# Patient Record
Sex: MALE | Race: WHITE | NOT HISPANIC OR LATINO | Employment: FULL TIME | URBAN - METROPOLITAN AREA
[De-identification: names, ages, dates, MRNs, and addresses within clinical notes are randomized per-mention and may not be internally consistent; named-entity substitution may affect disease eponyms.]

---

## 2022-01-03 ENCOUNTER — TELEPHONE (OUTPATIENT)
Dept: ORTHOPEDICS | Facility: CLINIC | Age: 32
End: 2022-01-03
Payer: COMMERCIAL

## 2022-01-03 DIAGNOSIS — M25.562 LEFT KNEE PAIN, UNSPECIFIED CHRONICITY: Primary | ICD-10-CM

## 2022-01-03 NOTE — TELEPHONE ENCOUNTER
Returned patient's call regarding message below. Informed patient that we can not order an MRI until he is seen and Dr. Espitia deems it necessary, but that his xrays will be about 30 mins before his appointment. Patient stated understanding.     ----- Message from Daja Mcpherson sent at 1/3/2022 10:40 AM CST -----  Contact: Tip  Patient is wanting orders placed for any tests like an X ray or MRI that need to be ran before his appointment on 1/5/2022. He is worried about a torn meniscus. Please call him back at [phone].

## 2022-01-05 ENCOUNTER — HOSPITAL ENCOUNTER (OUTPATIENT)
Dept: RADIOLOGY | Facility: HOSPITAL | Age: 32
Discharge: HOME | End: 2022-01-05
Attending: ORTHOPAEDIC SURGERY
Payer: COMMERCIAL

## 2022-01-05 ENCOUNTER — OFFICE VISIT (OUTPATIENT)
Dept: ORTHOPEDICS | Facility: CLINIC | Age: 32
End: 2022-01-05
Payer: COMMERCIAL

## 2022-01-05 VITALS — HEIGHT: 71 IN | BODY MASS INDEX: 32.2 KG/M2 | WEIGHT: 230 LBS

## 2022-01-05 DIAGNOSIS — M25.562 LEFT KNEE PAIN, UNSPECIFIED CHRONICITY: ICD-10-CM

## 2022-01-05 DIAGNOSIS — S89.92XA INJURY OF LEFT KNEE, INITIAL ENCOUNTER: Primary | ICD-10-CM

## 2022-01-05 DIAGNOSIS — M25.462 EFFUSION OF LEFT KNEE: ICD-10-CM

## 2022-01-05 PROCEDURE — 3008F BODY MASS INDEX DOCD: CPT | Mod: CPTII,S$GLB,, | Performed by: PHYSICIAN ASSISTANT

## 2022-01-05 PROCEDURE — 73564 XR KNEE ORTHO LEFT WITH FLEXION: ICD-10-PCS | Mod: 26,LT,, | Performed by: RADIOLOGY

## 2022-01-05 PROCEDURE — 99999 PR PBB SHADOW E&M-EST. PATIENT-LVL III: ICD-10-PCS | Mod: PBBFAC,,, | Performed by: PHYSICIAN ASSISTANT

## 2022-01-05 PROCEDURE — 99203 OFFICE O/P NEW LOW 30 MIN: CPT | Mod: S$GLB,,, | Performed by: PHYSICIAN ASSISTANT

## 2022-01-05 PROCEDURE — 3008F PR BODY MASS INDEX (BMI) DOCUMENTED: ICD-10-PCS | Mod: CPTII,S$GLB,, | Performed by: PHYSICIAN ASSISTANT

## 2022-01-05 PROCEDURE — 1160F PR REVIEW ALL MEDS BY PRESCRIBER/CLIN PHARMACIST DOCUMENTED: ICD-10-PCS | Mod: CPTII,S$GLB,, | Performed by: PHYSICIAN ASSISTANT

## 2022-01-05 PROCEDURE — 1159F PR MEDICATION LIST DOCUMENTED IN MEDICAL RECORD: ICD-10-PCS | Mod: CPTII,S$GLB,, | Performed by: PHYSICIAN ASSISTANT

## 2022-01-05 PROCEDURE — 73562 X-RAY EXAM OF KNEE 3: CPT | Mod: 26,RT,, | Performed by: RADIOLOGY

## 2022-01-05 PROCEDURE — 99203 PR OFFICE/OUTPT VISIT, NEW, LEVL III, 30-44 MIN: ICD-10-PCS | Mod: S$GLB,,, | Performed by: PHYSICIAN ASSISTANT

## 2022-01-05 PROCEDURE — 1160F RVW MEDS BY RX/DR IN RCRD: CPT | Mod: CPTII,S$GLB,, | Performed by: PHYSICIAN ASSISTANT

## 2022-01-05 PROCEDURE — 73564 X-RAY EXAM KNEE 4 OR MORE: CPT | Mod: 26,LT,, | Performed by: RADIOLOGY

## 2022-01-05 PROCEDURE — 73562 XR KNEE ORTHO LEFT WITH FLEXION: ICD-10-PCS | Mod: 26,RT,, | Performed by: RADIOLOGY

## 2022-01-05 PROCEDURE — 1159F MED LIST DOCD IN RCRD: CPT | Mod: CPTII,S$GLB,, | Performed by: PHYSICIAN ASSISTANT

## 2022-01-05 PROCEDURE — 73562 X-RAY EXAM OF KNEE 3: CPT | Mod: 59,TC,RT

## 2022-01-05 PROCEDURE — 99999 PR PBB SHADOW E&M-EST. PATIENT-LVL III: CPT | Mod: PBBFAC,,, | Performed by: PHYSICIAN ASSISTANT

## 2022-01-05 RX ORDER — DICLOFENAC SODIUM 10 MG/G
2 GEL TOPICAL 3 TIMES DAILY
Qty: 1 EACH | Refills: 1 | Status: SHIPPED | OUTPATIENT
Start: 2022-01-05

## 2022-01-05 RX ORDER — DEXTROAMPHETAMINE SACCHARATE, AMPHETAMINE ASPARTATE, DEXTROAMPHETAMINE SULFATE AND AMPHETAMINE SULFATE 5; 5; 5; 5 MG/1; MG/1; MG/1; MG/1
1 TABLET ORAL 2 TIMES DAILY
COMMUNITY
Start: 2021-12-31

## 2022-01-05 NOTE — LETTER
January 5, 2022      [facility]  [address]  [place] [address]  Phone: [phone]  Fax: [phone]       Patient: Tip Wiggins   YOB: 1990  Date of Visit: 01/05/2022    To Whom It May Concern:    Kari Wiggins  was at [facility] on 01/05/2022. The patient may return to work/school on 1/6/2021 with restrictions; seated work only and no ladders or stairs. If you have any questions or concerns, or if I can be of further assistance, please do not hesitate to contact me.    Sincerely,    Gretchen Slade PA-C

## 2022-01-05 NOTE — PATIENT INSTRUCTIONS
Assessment:  Tip Wiggins is a  31 y.o. 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 with a chief complaint of Pain of the Left Knee  Self referred for left knee pain after an injury that occurred in 12/31/21 when his knee gave out.    Left knee injury    Encounter Diagnoses   Name Primary?    Injury of left knee, initial encounter Yes    Effusion of left knee         Plan:   Note for work: Seated work at this time, no ladders or stairs.    MRI for left knee   [facility]: [facility]    Voltaren gel   Start: Ibuprofen OTC    Follow-up: After imaging or sooner if there are any problems between now and then.    Thank you for choosing [facility] and Dr. Parish Espitia for your orthopedic & sports medicine care. It is our goal to provide you with exceptional care that will help keep you healthy, active, and get you back in the game.    If you felt that you received exemplary care today, please consider leaving us feedback on Healthgrades at [email or web address].     Please do not hesitate to reach out to us via email, phone, or MyChart with any questions, concerns, or feedback.    If you are experiencing pain/discomfort ,or have questions after 5pm and would like to be connected to the [facility] on-call team, please call this number and specify which Sports Medicine provider is treating you: [phone]

## 2022-01-05 NOTE — PROGRESS NOTES
Patient ID: Tip Wiggins  YOB: 1990  MRN: [id number]    Chief Complaint: Pain of the Left Knee    Referred By: In network    History of Present Illness: Tip Wiggins is a 31 y.o. 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 with a chief complaint of Pain of the Left Knee  Symptom Onset:  His pain began on 12/31/2021.  There was a specific injury.  Patient states that his nephew jumped on his back and when he turned he felt and heard his left knee pop and fell.  Prior Treatment:   He has previously been treated by [facility] on .  Treatment included knee brace and icing.     Current Symptoms: His pain is currently located left knee.  Average level of pain is 4/10 when having pain.  Pain is improving.  He has experienced the following symptoms: pain, swelling, instability, giving way and popping/clicking.  Aggravating activities include kneeling, lateral movements, pivoting and squatting.     Past Medical History:   History reviewed. No pertinent past medical history.  Past Surgical History:   Procedure Laterality Date    ADENOIDECTOMY       History reviewed. No pertinent family history.  Social History     Socioeconomic History    Marital status: Unknown   Tobacco Use    Smoking status: Former Smoker    Smokeless tobacco: Current User     Review of patient's allergies indicates:  No Known Allergies    Physical Exam:   Body mass index is 32.08 kg/m².  GENERAL: Well appearing, appropriate for stated age, no acute distress.  CARDIOVASCULAR: Pulses regular by peripheral palpation.  PULMONARY: Respirations are even and non-labored.  NEURO: Awake, alert, and oriented x 3.  PSYCH: Mood & affect are appropriate.  HEENT: Head is normocephalic and atraumatic.    General    Nursing note and vitals reviewed.          Right Knee Exam   Right knee exam is normal.    Inspection   Effusion: absent    Tenderness   The patient is experiencing no tenderness.     Range of Motion   Extension: 0   Flexion: 120      Tests   Ligament Examination Lachman: normal (-1 to 2mm) PCL-Posterior Drawer: normal (0 to 2mm)     MCL - Valgus: normal (0 to 2mm)  LCL - Varus: normal    Other   Sensation: normal    Left Knee Exam   Left knee exam is normal.    Inspection   Effusion: present    Tenderness   The patient tender to palpation of the medial joint line.    Range of Motion   Extension: 5   Flexion: 120     Tests   Meniscus   Nir:  Medial - positive   Stability Lachman: normal (-1 to 2mm) PCL-Posterior Drawer: normal (0 to 2mm)  MCL - Valgus: normal (0 to 2mm)  LCL - Varus: normal (0 to 2mm)    Other   Popliteal (Baker's) Cyst: present  Sensation: normal    Comments:  Diffuse tenderness and moderate effusion. Muscle guarding    Muscle Strength   Right Lower Extremity   Hip Abduction: 5/5   Quadriceps:  5/5   Hamstrin/5   Left Lower Extremity   Hip Abduction: 5/5   Quadriceps:  4/5   Hamstrin/5     Vascular Exam     Right Pulses  Dorsalis Pedis:      2+  Posterior Tibial:      2+        Left Pulses  Dorsalis Pedis:      2+  Posterior Tibial:      2+            Neurovascular: Intact EHL, FHL, gastrocsoleus, and tibialis anterior. Sensation intact to light touch in superficial peroneal, deep peroneal, tibial, sural, and saphenous nerve distributions. Foot warm and well perfused with capillary refill of less than 2 seconds and palpable pedal pulses.     Imaging:   XR Results:  No results found for this or any previous visit.    MRI Results:  No results found for this or any previous visit.    CT Results:  No results found for this or any previous visit.    Relevant imaging results reviewed and interpreted by me, discussed with the patient and / or family today.     Other Tests:   No other tests performed today.    Patient Instructions     Assessment:  Tip Wiggins is a  31 y.o. 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 with a chief complaint of Pain of the Left Knee  Self referred for left knee pain after an injury  that occurred in 12/31/21 when his knee gave out.    Left knee injury    Encounter Diagnoses   Name Primary?    Injury of left knee, initial encounter Yes    Effusion of left knee         Plan:   Note for work: Seated work at this time, no ladders or stairs.    MRI for left knee   Physical therapy: [facility]    Voltaren gel   Start: Ibuprofen OTC    Follow-up: After imaging or sooner if there are any problems between now and then.    Thank you for choosing [facility] and Dr. Parish Espitia for your orthopedic & sports medicine care. It is our goal to provide you with exceptional care that will help keep you healthy, active, and get you back in the game.    If you felt that you received exemplary care today, please consider leaving us feedback on Healthgrades at [email or web address].     Please do not hesitate to reach out to us via email, phone, or MyChart with any questions, concerns, or feedback.    If you are experiencing pain/discomfort ,or have questions after 5pm and would like to be connected to the [facility] on-call team, please call this number and specify which Sports Medicine provider is treating you: [phone]         Provider Note/Medical Decision Making:   New injury with knee giving out and has noticed increase swelling and knee giving out with certain movements mostly pivoting motions. Possible meniscus tear but concerned about ACL injury with muscle guarding     I discussed worrisome and red flag signs and symptoms with the patient. The patient expressed understanding and agreed to alert me immediately or to go to the emergency room if they experience any of these.    Treatment plan was developed with input from the patient/family, and they expressed understanding and agreement with the plan. All questions were answered today.    Disclaimer: This note was prepared  using a voice recognition system and is likely to have sound alike errors within the text.

## 2022-01-06 ENCOUNTER — TELEPHONE (OUTPATIENT)
Dept: ORTHOPEDICS | Facility: CLINIC | Age: 32
End: 2022-01-06
Payer: COMMERCIAL

## 2022-01-07 ENCOUNTER — HOSPITAL ENCOUNTER (OUTPATIENT)
Dept: RADIOLOGY | Facility: HOSPITAL | Age: 32
Discharge: HOME | End: 2022-01-07
Attending: PHYSICIAN ASSISTANT
Payer: COMMERCIAL

## 2022-01-07 DIAGNOSIS — S89.92XA INJURY OF LEFT KNEE, INITIAL ENCOUNTER: ICD-10-CM

## 2022-01-07 PROCEDURE — 73721 MRI KNEE WITHOUT CONTRAST LEFT: ICD-10-PCS | Mod: 26,LT,, | Performed by: RADIOLOGY

## 2022-01-07 PROCEDURE — 73721 MRI JNT OF LWR EXTRE W/O DYE: CPT | Mod: 26,LT,, | Performed by: RADIOLOGY

## 2022-01-07 PROCEDURE — 73721 MRI JNT OF LWR EXTRE W/O DYE: CPT | Mod: TC,PO,LT

## 2022-01-12 ENCOUNTER — OFFICE VISIT (OUTPATIENT)
Dept: ORTHOPEDICS | Facility: CLINIC | Age: 32
End: 2022-01-12
Payer: COMMERCIAL

## 2022-01-12 VITALS — HEIGHT: 71 IN | WEIGHT: 230 LBS | BODY MASS INDEX: 32.2 KG/M2

## 2022-01-12 DIAGNOSIS — T14.8XXA CONTUSION OF BONE: ICD-10-CM

## 2022-01-12 DIAGNOSIS — S83.512A RUPTURE OF ANTERIOR CRUCIATE LIGAMENT OF LEFT KNEE, INITIAL ENCOUNTER: Primary | ICD-10-CM

## 2022-01-12 PROCEDURE — 3008F BODY MASS INDEX DOCD: CPT | Mod: CPTII,S$GLB,, | Performed by: ORTHOPAEDIC SURGERY

## 2022-01-12 PROCEDURE — 1159F MED LIST DOCD IN RCRD: CPT | Mod: CPTII,S$GLB,, | Performed by: ORTHOPAEDIC SURGERY

## 2022-01-12 PROCEDURE — 3008F PR BODY MASS INDEX (BMI) DOCUMENTED: ICD-10-PCS | Mod: CPTII,S$GLB,, | Performed by: ORTHOPAEDIC SURGERY

## 2022-01-12 PROCEDURE — 99999 PR PBB SHADOW E&M-EST. PATIENT-LVL III: ICD-10-PCS | Mod: PBBFAC,,, | Performed by: ORTHOPAEDIC SURGERY

## 2022-01-12 PROCEDURE — 99999 PR PBB SHADOW E&M-EST. PATIENT-LVL III: CPT | Mod: PBBFAC,,, | Performed by: ORTHOPAEDIC SURGERY

## 2022-01-12 PROCEDURE — 99214 PR OFFICE/OUTPT VISIT, EST, LEVL IV, 30-39 MIN: ICD-10-PCS | Mod: S$GLB,,, | Performed by: ORTHOPAEDIC SURGERY

## 2022-01-12 PROCEDURE — 1159F PR MEDICATION LIST DOCUMENTED IN MEDICAL RECORD: ICD-10-PCS | Mod: CPTII,S$GLB,, | Performed by: ORTHOPAEDIC SURGERY

## 2022-01-12 PROCEDURE — 99214 OFFICE O/P EST MOD 30 MIN: CPT | Mod: S$GLB,,, | Performed by: ORTHOPAEDIC SURGERY

## 2022-01-12 PROCEDURE — 1160F RVW MEDS BY RX/DR IN RCRD: CPT | Mod: CPTII,S$GLB,, | Performed by: ORTHOPAEDIC SURGERY

## 2022-01-12 PROCEDURE — 1160F PR REVIEW ALL MEDS BY PRESCRIBER/CLIN PHARMACIST DOCUMENTED: ICD-10-PCS | Mod: CPTII,S$GLB,, | Performed by: ORTHOPAEDIC SURGERY

## 2022-01-12 RX ORDER — CHOLECALCIFEROL (VITAMIN D3) 125 MCG
220 CAPSULE ORAL EVERY OTHER DAY
COMMUNITY

## 2022-01-12 NOTE — PROGRESS NOTES
Patient ID: Tip Wiggins  YOB: 1990  MRN: [id number]    Chief Complaint: Pain and Swelling of the Left Knee    Referred By: Self referred    History of Present Illness: Tip Wiggins is a 31 y.o. 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 with a chief complaint of Pain and Swelling of the Left Knee  Patient presents to the clinic today for a Left Knee MRI Review. He rates his pain as 4/10. He notes that yesterday his knee gave out from under him when he was stepping down from the tailgate of his truck. He felt like his knee was getting better until yesterday. Prior to the re-injury, he was able to squat slowly, kneeling was not bothering his knee, and pivoting would only sometimes aggravate his knee. Today is supposed to be his first day of PT, but he is considering postponing it due to the re-injury.    Previous History of Present Illness (1/5/2022):   Symptom Onset:           His pain began on 12/31/2021.  There was a specific injury.  Patient states that his nephew jumped on his back and when he turned he felt and heard his left knee pop and fell.  Prior Treatment:          He has previously been treated by [facility] on .  Treatment included knee brace and icing.     Current Symptoms:      His pain is currently located left knee.  Average level of pain is 4/10 when having pain.  Pain is improving.  He has experienced the following symptoms: pain, swelling, instability, giving way and popping/clicking.  Aggravating activities include kneeling, lateral movements, pivoting and squatting    Previous Plan:  ***    Past Medical History:   History reviewed. No pertinent past medical history.  Past Surgical History:   Procedure Laterality Date    ADENOIDECTOMY       History reviewed. No pertinent family history.  Social History     Socioeconomic History    Marital status: Unknown   Tobacco Use    Smoking status: Former Smoker    Smokeless tobacco: Current User     Medication List with  Changes/Refills   Current Medications    DEXTROAMPHETAMINE-AMPHETAMINE (ADDERALL) 20 MG TABLET    Take 1 tablet by mouth 2 (two) times daily.    DICLOFENAC SODIUM (VOLTAREN) 1 % GEL    Apply 2 g topically 3 (three) times daily.    NAPROXEN SODIUM (ALEVE) 220 MG CAP    Take 220 mg by mouth every other day.   Review of patient's allergies indicates:  No Known Allergies    Physical Exam:   Body mass index is 32.08 kg/m².  GENERAL: Well appearing, appropriate for stated age, no acute distress.  CARDIOVASCULAR: Pulses regular by peripheral palpation.  PULMONARY: Respirations are even and non-labored.  NEURO: Awake, alert, and oriented x 3.  PSYCH: Mood & affect are appropriate.  HEENT: Head is normocephalic and atraumatic.    General    Nursing note and vitals reviewed.          Right Knee Exam   Right knee exam is normal.    Inspection   Effusion: absent    Tenderness   The patient is experiencing no tenderness.     Range of Motion   Extension: 0   Flexion: 130     Tests   Ligament Examination Lachman: normal (-1 to 2mm) PCL-Posterior Drawer: normal (0 to 2mm)     MCL - Valgus: normal (0 to 2mm)  LCL - Varus: normal    Other   Sensation: normal    Left Knee Exam     Inspection   Effusion: present    Tenderness   The patient tender to palpation of the lateral joint line and lateral retinaculum.    Range of Motion   Extension: 0   Flexion: 110     Tests   Stability Lachman: abnormal PCL-Posterior Drawer: normal (0 to 2mm)  MCL - Valgus: normal (0 to 2mm)  LCL - Varus: normal (0 to 2mm)    Other   Sensation: normal    Muscle Strength   Right Lower Extremity   Hip Abduction: 5/5   Quadriceps:  5/5   Hamstrin/5   Left Lower Extremity   Hip Abduction: 5/5   Quadriceps:  4/5   Hamstrin/5     Vascular Exam     Right Pulses  Dorsalis Pedis:      2+  Posterior Tibial:      2+        Left Pulses  Dorsalis Pedis:      2+  Posterior Tibial:      2+            Neurovascular: Intact EHL, FHL, gastrocsoleus, and tibialis  anterior. Sensation intact to light touch in superficial peroneal, deep peroneal, tibial, sural, and saphenous nerve distributions. Foot warm and well perfused with capillary refill of less than 2 seconds and palpable pedal pulses.     Imaging:   XR Results:  Results for orders placed during the hospital encounter of 01/05/22    X-ray Knee Ortho Left with Flexion    Narrative  EXAMINATION:  XR KNEE ORTHO LEFT WITH FLEXION    CLINICAL HISTORY:  . Pain in left knee    TECHNIQUE:  AP standing view of both knees, PA flexion standing views of both knees, and Merchant views of both knees were performed. A lateral view of the left knee was also performed.    COMPARISON:  None    FINDINGS:  No acute fracture or dislocation.  Equivocal minimal medial compartment narrowing on either side.  Soft tissues are normal.    Impression  As above      Electronically signed by: Haja Tsang MD  Date:    01/05/2022  Time:    11:11    MRI Results:  Results for orders placed during the hospital encounter of 01/07/22    MRI Knee Without Contrast Left    Narrative  EXAMINATION:  MRI KNEE WITHOUT CONTRAST LEFT    CLINICAL HISTORY:  Knee trauma, no prior imaging (Age >= 5y);left knee injury;Unspecified injury of left lower leg, initial encounter    TECHNIQUE:  Multiplanar, multisequence images were preformed of the left knee.    COMPARISON:  None.    FINDINGS:  Menisci:  There is no discrete tear of the medial or lateral meniscus.    Ligaments:  ACL is disrupted at its femoral attachment.  PCL, MCL, and LCL complex are intact.    Tendons:  Extensor mechanism is maintained.    Cartilage:    Patellofemoral: Articular cartilage is maintained.    Medial tibiofemoral: Articular cartilage is maintained.    Lateral tibiofemoral: Articular cartilage is maintained.    Bone: Multifocal areas of marrow edema/contusion beneath the posterior aspect of the medial and lateral tibial plateaus and in the lateral femoral condyle.    Miscellaneous: Moderate  size knee joint effusion.  Baker's cyst.    Impression  1. ACL disruption.  2. Multifocal bony contusions.  3. Moderate knee joint effusion.      Electronically signed by: ALLISON Bianchi MD  Date:    01/07/2022  Time:    12:24    CT Results:  No results found for this or any previous visit.    Relevant imaging results reviewed and interpreted by me, discussed with the patient and / or family today.     Other Tests:   No other tests performed today.    Patient Instructions     Assessment:  Tip Wiggins is a  31 y.o. 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 with a chief complaint of Pain and Swelling of the Left Knee  Self referred for left knee pain after an injury that occurred on 12/31/21 when his left knee gave out.    Left knee injury    Encounter Diagnosis   Name Primary?    Rupture of anterior cruciate ligament of left knee, initial encounter Yes        Plan:   [facility] at [facility]- will start today   Voltaren gel & Ibuprofen OTC as needed      Follow-up:  or sooner if there are any problems between now and then.    Thank you for choosing [facility] and Dr. Parish Espitia for your orthopedic & sports medicine care. It is our goal to provide you with exceptional care that will help keep you healthy, active, and get you back in the game.    If you felt that you received exemplary care today, please consider leaving us feedback on Healthgrades at [email or web address].     Please do not hesitate to reach out to us via email, phone, or MyChart with any questions, concerns, or feedback.    If you are experiencing pain/discomfort ,or have questions after 5pm and would like to be connected to the [facility] on-call team, please call this number and specify which Sports Medicine provider is treating you: [phone]         Provider Note/Medical Decision Making: ***     I  discussed worrisome and red flag signs and symptoms with the patient. The patient expressed understanding and agreed to alert me immediately or to go to the emergency room if they experience any of these.    Treatment plan was developed with input from the patient/family, and they expressed understanding and agreement with the plan. All questions were answered today.    Disclaimer: This note was prepared using a voice recognition system and is likely to have sound alike errors within the text.

## 2022-01-12 NOTE — PROGRESS NOTES
Patient ID: Tip Wiggins  YOB: 1990  MRN: [id number]    Chief Complaint: Pain and Swelling of the Left Knee    Referred By: Self referred    History of Present Illness: Tip Wiggins is a 31 y.o. 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 with a chief complaint of Pain and Swelling of the Left Knee  Patient presents to the clinic today for a Left Knee MRI Review.  He injured his knee on 12/31/2021 when his nephew jumped on his back while pivoting and he felt a pop in his knee and fell down.  He rates his pain as 4/10. He notes that yesterday his knee gave out from under him when he was stepping down from the tailgate of his truck. He felt like his knee was getting better until yesterday. Prior to the re-injury, he was able to squat slowly, kneeling was not bothering his knee, and pivoting would only sometimes aggravate his knee. Today is supposed to be his first day of PT, but he is considering postponing it due to the re-injury.    Previous History of Present Illness (1/5/2022):   Symptom Onset:           His pain began on 12/31/2021.  There was a specific injury.  Patient states that his nephew jumped on his back and when he turned he felt and heard his left knee pop and fell.  Prior Treatment:          He has previously been treated by [facility] on .  Treatment included knee brace and icing.     Current Symptoms:      His pain is currently located left knee.  Average level of pain is 4/10 when having pain.  Pain is improving.  He has experienced the following symptoms: pain, swelling, instability, giving way and popping/clicking.  Aggravating activities include kneeling, lateral movements, pivoting and squatting    Previous Plan:  MRI ordered    Past Medical History:   History reviewed. No pertinent past medical history.  Past Surgical History:   Procedure Laterality Date    ADENOIDECTOMY       History reviewed. No pertinent family history.  Social History     Socioeconomic History    Marital  status: Unknown   Tobacco Use    Smoking status: Former Smoker    Smokeless tobacco: Current User     Medication List with Changes/Refills   Current Medications    DEXTROAMPHETAMINE-AMPHETAMINE (ADDERALL) 20 MG TABLET    Take 1 tablet by mouth 2 (two) times daily.    DICLOFENAC SODIUM (VOLTAREN) 1 % GEL    Apply 2 g topically 3 (three) times daily.    NAPROXEN SODIUM (ALEVE) 220 MG CAP    Take 220 mg by mouth every other day.   Review of patient's allergies indicates:  No Known Allergies    Physical Exam:   Body mass index is 32.08 kg/m².  GENERAL: Well appearing, appropriate for stated age, no acute distress.  CARDIOVASCULAR: Pulses regular by peripheral palpation.  PULMONARY: Respirations are even and non-labored.  NEURO: Awake, alert, and oriented x 3.  PSYCH: Mood & affect are appropriate.  HEENT: Head is normocephalic and atraumatic.    General    Nursing note and vitals reviewed.          Right Knee Exam   Right knee exam is normal.    Inspection   Effusion: absent    Tenderness   The patient is experiencing no tenderness.     Range of Motion   Extension: 0   Flexion: 130     Tests   Ligament Examination Lachman: normal (-1 to 2mm) PCL-Posterior Drawer: normal (0 to 2mm)     MCL - Valgus: normal (0 to 2mm)  LCL - Varus: normal    Other   Sensation: normal    Left Knee Exam     Inspection   Effusion: present    Tenderness   The patient tender to palpation of the lateral joint line and lateral retinaculum.    Range of Motion   Extension: 0   Flexion: 110     Tests   Stability Lachman: abnormal PCL-Posterior Drawer: normal (0 to 2mm)  MCL - Valgus: normal (0 to 2mm)  LCL - Varus: normal (0 to 2mm)    Other   Sensation: normal    Muscle Strength   Right Lower Extremity   Hip Abduction: 5/5   Quadriceps:  5/5   Hamstrin/5   Left Lower Extremity   Hip Abduction: 5/5   Quadriceps:  4/5   Hamstrin/5     Vascular Exam     Right Pulses  Dorsalis Pedis:      2+  Posterior Tibial:      2+        Left  Pulses  Dorsalis Pedis:      2+  Posterior Tibial:      2+            Neurovascular: Intact EHL, FHL, gastrocsoleus, and tibialis anterior. Sensation intact to light touch in superficial peroneal, deep peroneal, tibial, sural, and saphenous nerve distributions. Foot warm and well perfused with capillary refill of less than 2 seconds and palpable pedal pulses.     Imaging:   XR Results:  Results for orders placed during the hospital encounter of 01/05/22    X-ray Knee Ortho Left with Flexion    Narrative  EXAMINATION:  XR KNEE ORTHO LEFT WITH FLEXION    CLINICAL HISTORY:  . Pain in left knee    TECHNIQUE:  AP standing view of both knees, PA flexion standing views of both knees, and Merchant views of both knees were performed. A lateral view of the left knee was also performed.    COMPARISON:  None    FINDINGS:  No acute fracture or dislocation.  Equivocal minimal medial compartment narrowing on either side.  Soft tissues are normal.    Impression  As above      Electronically signed by: Haja Tsang MD  Date:    01/05/2022  Time:    11:11    MRI Results:  Results for orders placed during the hospital encounter of 01/07/22    MRI Knee Without Contrast Left    Narrative  EXAMINATION:  MRI KNEE WITHOUT CONTRAST LEFT    CLINICAL HISTORY:  Knee trauma, no prior imaging (Age >= 5y);left knee injury;Unspecified injury of left lower leg, initial encounter    TECHNIQUE:  Multiplanar, multisequence images were preformed of the left knee.    COMPARISON:  None.    FINDINGS:  Menisci:  There is no discrete tear of the medial or lateral meniscus.    Ligaments:  ACL is disrupted at its femoral attachment.  PCL, MCL, and LCL complex are intact.    Tendons:  Extensor mechanism is maintained.    Cartilage:    Patellofemoral: Articular cartilage is maintained.    Medial tibiofemoral: Articular cartilage is maintained.    Lateral tibiofemoral: Articular cartilage is maintained.    Bone: Multifocal areas of marrow edema/contusion  beneath the posterior aspect of the medial and lateral tibial plateaus and in the lateral femoral condyle.    Miscellaneous: Moderate size knee joint effusion.  Baker's cyst.    Impression  1. ACL disruption.  2. Multifocal bony contusions.  3. Moderate knee joint effusion.      Electronically signed by: ALLISON Bianchi MD  Date:    01/07/2022  Time:    12:24    CT Results:  No results found for this or any previous visit.    Relevant imaging results reviewed and interpreted by me, discussed with the patient and / or family today.     Other Tests:   No other tests performed today.    Patient Instructions     Assessment:  Tip Wiggins is a  31 y.o. male [employer] @ [employer]. with a chief complaint of Pain and Swelling of the Left Knee  Self referred for left knee pain after an injury that occurred on 12/31/21 when his left knee gave out.    Left knee injury   Left knee ACL femoral sided tear   Left knee bone contusion    Encounter Diagnoses   Name Primary?    Rupture of anterior cruciate ligament of left knee, initial encounter Yes    Contusion of bone         Plan:   Patient with femoral sided ACL tear.  He may meet criteria for ACL repair with BEAR implant.  We have discussed the procedure and outcomes from academic research studies that show this is an option.   After long discussion, the patient would like to trial non-operative treatment. We have explained that if he continues to have instability within the D-O protocol he may need surgery in the future.     We are checking with our representative about ACL repair to see what the time constraints are for repair.   Start physical therapy at [facility] with sports therapists with [place] protocol   Voltaren gel & Ibuprofen OTC as needed    Follow-up: 4 weeks or sooner if there are any problems between now and then.    Thank you for choosing [facility] and Dr. Parish Espitia for your orthopedic & sports  medicine care. It is our goal to provide you with exceptional care that will help keep you healthy, active, and get you back in the game.    If you felt that you received exemplary care today, please consider leaving us feedback on [employer] [employer] at [email or web address].     Please do not hesitate to reach out to us via email, phone, or MyChart with any questions, concerns, or feedback.    If you are experiencing pain/discomfort ,or have questions after 5pm and would like to be connected to the [facility] [place] on-call team, please call this number and specify which Sports Medicine provider is treating you: [phone]       Provider Note/Medical Decision Making:      I discussed worrisome and red flag signs and symptoms with the patient. The patient expressed understanding and agreed to alert me immediately or to go to the emergency room if they experience any of these.    Treatment plan was developed with input from the patient/family, and they expressed understanding and agreement with the plan. All questions were answered today.    Disclaimer: This note was prepared using a voice recognition system and is likely to have sound alike errors within the text.     I, Sae Mcneal, acted as a scribe for Parish Espitia MD for the duration of this office visit.

## 2022-01-12 NOTE — PATIENT INSTRUCTIONS
Assessment:  Tip Wiggins is a  31 y.o. male [employer] @ [employer]. with a chief complaint of Pain and Swelling of the Left Knee  Self referred for left knee pain after an injury that occurred on 12/31/21 when his left knee gave out.    Left knee injury   Left knee ACL femoral sided tear   Left knee bone contusion    Encounter Diagnoses   Name Primary?    Rupture of anterior cruciate ligament of left knee, initial encounter Yes    Contusion of bone         Plan:   Patient with femoral sided ACL tear.  He may meet criteria for ACL repair with BEAR implant.  We have discussed the procedure and outcomes from academic research studies that show this is an option.   After long discussion, the patient would like to trial non-operative treatment. We have explained that if he continues to have instability within the D-O protocol he may need surgery in the future.     We are checking with our representative about ACL repair to see what the time constraints are for repair.   Start physical therapy at [facility] with sports therapists with [place] protocol   Voltaren gel & Ibuprofen OTC as needed    Follow-up: 4 weeks or sooner if there are any problems between now and then.    Thank you for choosing [facility] and Dr. Parish Espitia for your orthopedic & sports medicine care. It is our goal to provide you with exceptional care that will help keep you healthy, active, and get you back in the game.    If you felt that you received exemplary care today, please consider leaving us feedback on [employer] at [email or web address].     Please do not hesitate to reach out to us via email, phone, or MyChart with any questions, concerns, or feedback.    If you are experiencing pain/discomfort ,or have questions after 5pm and would like to be connected to the [facility] on-call team, please call this  number and specify which Sports Medicine provider is treating you: [phone]

## 2022-01-13 ENCOUNTER — CLINICAL SUPPORT (OUTPATIENT)
Dept: REHABILITATION | Facility: HOSPITAL | Age: 32
End: 2022-01-13
Attending: ORTHOPAEDIC SURGERY
Payer: COMMERCIAL

## 2022-01-13 DIAGNOSIS — S83.512A RUPTURE OF ANTERIOR CRUCIATE LIGAMENT OF LEFT KNEE, INITIAL ENCOUNTER: ICD-10-CM

## 2022-01-13 DIAGNOSIS — M25.562 ACUTE PAIN OF LEFT KNEE: ICD-10-CM

## 2022-01-13 DIAGNOSIS — M62.81 PROXIMAL MUSCLE WEAKNESS: ICD-10-CM

## 2022-01-13 DIAGNOSIS — T14.8XXA CONTUSION OF BONE: ICD-10-CM

## 2022-01-13 PROCEDURE — 97110 THERAPEUTIC EXERCISES: CPT

## 2022-01-13 PROCEDURE — 97161 PT EVAL LOW COMPLEX 20 MIN: CPT

## 2022-01-13 NOTE — PLAN OF CARE
[facility]   Physical Therapy Initial Evaluation     Date: 1/13/2022   Name: Tip Wiggins  Clinic Number: [id number]    Therapy Diagnosis:   Encounter Diagnoses   Name Primary?    Rupture of anterior cruciate ligament of left knee, initial encounter     Contusion of bone     Acute pain of left knee     Proximal muscle weakness      Physician: Parish Espitia MD    Physician Orders: PT Eval and Treat  Medical Diagnosis from Referral:   S83.512A (ICD-10-CM) - Rupture of anterior cruciate ligament of left knee, initial encounter   T14.8XXA (ICD-10-CM) - Contusion of bone     Evaluation Date: 1/13/2022  Authorization Period Expiration: 12/01/2022  Plan of Care Expiration: 02/24/2022  Progress Note Due: 02/12/2022  Visit # / Visits authorized: 1/ 1   FOTO: 1/ 3     Precautions: Standard    Time In: 10:45 am  Time Out: 11:34 am   Total Appointment Time (timed & untimed codes): 49 minutes      SUBJECTIVE   Date of onset: January 31, 2021    History of current condition - Tip reports: standing on a hill with his left foot uphil and right foot downhill - friend of his joking jumped on his back and he felt his knee pop and give way after he made a quick turn to try and shrug the other individual off.    Falls: none    Imaging, MRI studies: rupture of left ACL    Prior Therapy: no  Social History: lives with their spouse  Occupation: [employer]   Prior Level of Function: independent   Current Level of Function: patient is able to ambulate with difficulty but is limited with activities that require moderate speed movements or movements that require stability such as work tasks, working in the yard.    Pain:  Current 2/10, worst 5/10, best 2/10   Location: left knee  left knee(s)   Description: moments of sharp pain  Aggravating Factors: Walking  Easing Factors: elevation, NSAIDs, bracing, anti-inflammatory ointment    Patients goals: return to work without  pain/discomfort     Medical History:   No past medical history on file.    Surgical History:   Tip Wiggins  has a past surgical history that includes Adenoidectomy.    Medications:   Tip has a current medication list which includes the following prescription(s): dextroamphetamine-amphetamine, diclofenac sodium, and naproxen sodium.    Allergies:   Review of patient's allergies indicates:  No Known Allergies       OBJECTIVE     Observation: Patient presents to physical therapy with an amicable comportment. Patient was willing to participate in all therapeutic activities.    Posture: unremarkable    Gait: flexed knee gait on left side during all phases      Range of Motion:     Knee Right active Right Passive Left Active Left passive Goal   Flexion 125 130 115 120 135 deg.   Extension 0 -2 Lacking 2 0 0 deg.       Lower Extremity Strength:    Right LE  Left LE  Goal   Knee extension: 4/5 Knee extension: 4-/5 5/5   Knee flexion: 4/5 Knee flexion: 4-/5 5/5   Hip flexion: 4/5 Hip flexion: 4/5 5/5   Hip extension:  4-/5 Hip extension: 4-/5 5/5   Hip abduction: 4-/5 Hip abduction: 4-/5 5/5     Ligamentous Pathology   Test Right Left   Anterior Drawer Negative Positive   Lachman's Negative Positive       Limitation/Restriction for FOTO Knee Survey    Therapist reviewed FOTO scores for Tip Wiggins on 1/13/2022.   FOTO documents entered into [employer] - see Media section.    Limitation Score: 47%         TREATMENT     Total Treatment time (time-based codes) separate from Evaluation: 15 minutes      Tip received the treatments listed below:      THERAPEUTIC EXERCISES to develop  strength, endurance, ROM, flexibility, posture and core stabilization for 15 minutes including patient education, assessment, and the following:    Intervention    Performed Today Sets/Reps/Resistance     Prone Terminal Knee Extension x 3x10   Long Arc Quad x 3x10   Quadriceps Set x 30x - holding 3 seconds   HEP x                Plan for Next Visit:         PATIENT EDUCATION AND HOME EXERCISES   Education provided:   Role of Physical Therapist  Physical Therapy Plan Of Care  home exercise program   [facility] Protocol  Importance of compliance with home exercise program   Compliance with attendance to therapy       Written Home Exercises Provided: yes.  Exercises were reviewed and Tip was able to demonstrate them prior to the end of the session.  Tip demonstrated good  understanding of the education provided.     See EMR under Patient Instructions for exercises provided 1/13/2022    ASSESSMENT     Tip is a 31 y.o. male referred to outpatient Physical Therapy with a medical diagnosis of rupture of the ACL, left knee; bone contusion. Patient presents with signs and symptoms consistent with a physical therapy diagnosis of left anterior cruciate ligament tear including the above listed objective test and measures. During today's session patient demonstrated understanding of plan of care and home exercise program.    Patient prognosis is Excellent.   Patientt will benefit from skilled outpatient Physical Therapy to address the deficits stated above and in the chart below, provide patient /family education, and to maximize patientt's level of independence.     Plan of care discussed with patient: Yes  Patient's spiritual, cultural and educational needs considered and patient is agreeable to the plan of care and goals as stated below:     Anticipated Barriers for therapy: lifetstyle    Medical Necessity is demonstrated by the following  History  Co-morbidities and personal factors that may impact the plan of care Co-morbidities:   none    Personal Factors:   lifestyle     low   Examination  Body Structures and Functions, activity limitations and participation restrictions that may impact the plan of care Body Regions:   lower extremities    Body Systems:    gross symmetry  ROM  strength  gross coordinated movement  balance  gait  transitions  motor  control    Participation Restrictions:   none    Activity limitations:   Learning and applying knowledge  no deficits    General Tasks and Commands  no deficits    Communication  no deficits    Mobility  no deficits    Self care  no deficits    Domestic Life  no deficits    Interactions/Relationships  no deficits    Life Areas  no deficits    Community and Social Life  community life  recreation and leisure         low   Clinical Presentation stable and uncomplicated low   Decision Making/ Complexity Score: low     Goals:    SHORT TERM GOALS:  4 weeks 1/13/2022   1. Recent signs and systems trend is improving in order to progress towards LTG's.    2. Patient will be independent with HEP in order to further progress and return to maximal function.    3. Pain rating at Worst: 5/10 in order to progress towards increased independence with activity.    4. Patient will be able to correct postural deviations in sitting and standing, to decrease pain and promote postural awareness for injury prevention.       LONG TERM GOALS: 6 weeks 1/13/2022   1. Patient will return to normal ADL, recreational, and work related activities with less pain and limitation.     2. Patient will improve AROM to stated goals in order to return to maximal functional potential.     3. Patient will improve Strength to stated goals of appropriate musculature in order to improve functional independence.     4. Pain Rating at Best: 1/10 to improve Quality of Life.     5. Patient will meet predicted functional outcome (FOTO) score: 77% to increase self-worth & perceived functional ability.    6. Patient will have met/partially met personal goal of: returning to work with minimal pain and/or limitations        PLAN   Plan of care Certification: 1/13/2022 to 02/24/2022.    Outpatient Physical Therapy 3 times weekly for 6 weeks to include the following interventions: Electrical Stimulation [place] and Symmetrical Biphasic, Gait Training, Manual Therapy,  Moist Heat/ Ice, Neuromuscular Re-ed, Patient Education, Self Care, Therapeutic Activities and Therapeutic Exercise.     Gerardo Montes, PT      I CERTIFY THE NEED FOR THESE SERVICES FURNISHED UNDER THIS PLAN OF TREATMENT AND WHILE UNDER MY CARE   Physician's comments:     Physician's Signature: ___________________________________________________

## 2022-01-19 ENCOUNTER — CLINICAL SUPPORT (OUTPATIENT)
Dept: REHABILITATION | Facility: HOSPITAL | Age: 32
End: 2022-01-19
Payer: COMMERCIAL

## 2022-01-19 DIAGNOSIS — M25.562 ACUTE PAIN OF LEFT KNEE: ICD-10-CM

## 2022-01-19 DIAGNOSIS — M62.81 PROXIMAL MUSCLE WEAKNESS: ICD-10-CM

## 2022-01-19 PROCEDURE — 97110 THERAPEUTIC EXERCISES: CPT | Performed by: PHYSICAL THERAPIST

## 2022-01-19 NOTE — PROGRESS NOTES
"[facility]   Physical Therapy Treatment Note     Name: Tip Wiggins  Clinic Number: [id number]    Therapy Diagnosis:   Encounter Diagnoses   Name Primary?    Acute pain of left knee     Proximal muscle weakness      Physician: Parish Espitia MD    Visit Date: 1/19/2022    Physician Orders: PT Eval and Treat  Medical Diagnosis from Referral:   S83.512A (ICD-10-CM) - Rupture of anterior cruciate ligament of left knee, initial encounter   T14.8XXA (ICD-10-CM) - Contusion of bone      Evaluation Date: 1/13/2022  Authorization Period Expiration: 12/01/2022  Plan of Care Expiration: 02/24/2022  Progress Note Due: 02/12/2022  Visit # / Visits authorized: 1/ 1   FOTO: 1/ 3       PTA Visit #: 0/5     Time In: 0830  Time Out: 0915  Total Billable Time: 45 minutes    Precautions: Standard    SUBJECTIVE     Pt reports: knee feels OK, some hamstring tightness but no further pain noted  He was compliant with home exercise program.  Response to previous treatment: no significant changes  Functional change: none yet    Pain: 1/10  Location: left knee      OBJECTIVE     Objective Measures updated at progress report unless specified.     TREATMENT     Tip received the treatments listed below:      Tip received therapeutic exercises to develop strength for 45 minutes including:  Quad sets 5" hold, 25x  SLR 3x8  LAQ 3#, 30x  Lateral squat walks 3 laps RTB  Monster walks 3 laps RTB  Shuttle leg press 8 bands 3x12 (DL)  Shuttle leg press 7 bands 25x (SL)  Step ups 8" box, 10#KB. 25x  Lateral Step downs 4" box, balance pole used. 4x6  Upright bike 6 minutes L3    Tip received the following manual therapy techniques: Myofacial release and Soft tissue Mobilization were applied to the: left knee for 0 minutes, including:      Tip participated in neuromuscular re-education activities to improve: Balance, Kinesthetic, Sense and Proprioception for 0 minutes. The following activities were " included:      Tip participated in dynamic functional therapeutic activities to improve functional performance for   minutes, including:        Patient Education and Home Exercises     Education provided:   Role of Physical Therapist  Physical Therapy Plan Of Care  home exercise program   [facility] Protocol  Importance of compliance with home exercise program   Compliance with attendance to therapy         Written Home Exercises Provided: yes.  Exercises were reviewed and Tip was able to demonstrate them prior to the end of the session.  Tip demonstrated good  understanding of the education provided.      See EMR under Patient Instructions for exercises provided 1/13/2022    ASSESSMENT     Pt performed increase in quadriceps and hip strengthening with no increase in pain or instability in knee. Pt presents with very minimal swelling and stiffness in hamstrings as main symptoms at this point. Would like to try elliptical at some point in next week as well.     Tip Is progressing well towards his goals.   Pt prognosis is Excellent.     Pt will continue to benefit from skilled outpatient physical therapy to address the deficits listed in the problem list box on initial evaluation, provide pt/family education and to maximize pt's level of independence in the home and community environment.     Pt's spiritual, cultural and educational needs considered and pt agreeable to plan of care and goals.     Anticipated barriers to physical therapy: lifestyle    Goals:   SHORT TERM GOALS:  4 weeks 1/13/2022   1. Recent signs and systems trend is improving in order to progress towards LTG's.     2. Patient will be independent with HEP in order to further progress and return to maximal function.     3. Pain rating at Worst: 5/10 in order to progress towards increased independence with activity.     4. Patient will be able to correct postural deviations in sitting and standing, to decrease pain and promote postural  awareness for injury prevention.         LONG TERM GOALS: 6 weeks 1/13/2022   1. Patient will return to normal ADL, recreational, and work related activities with less pain and limitation.      2. Patient will improve AROM to stated goals in order to return to maximal functional potential.      3. Patient will improve Strength to stated goals of appropriate musculature in order to improve functional independence.      4. Pain Rating at Best: 1/10 to improve Quality of Life.      5. Patient will meet predicted functional outcome (FOTO) score: 77% to increase self-worth & perceived functional ability.     6. Patient will have met/partially met personal goal of: returning to work with minimal pain and/or limitations           PLAN     Continue with physical therapy as planned.     Plan of care Certification: 1/13/2022 to 02/24/2022.     Outpatient Physical Therapy 3 times weekly for 6 weeks to include the following interventions: Electrical Stimulation [place]oth[place] and Symmetrical Biphasic, Gait Training, Manual Therapy, Moist Heat/ Ice, Neuromuscular Re-ed, Patient Education, Self Care, Therapeutic Activities and Therapeutic Exercise.     Kaleb Crawford, PT, DPT

## 2022-01-26 ENCOUNTER — CLINICAL SUPPORT (OUTPATIENT)
Dept: REHABILITATION | Facility: HOSPITAL | Age: 32
End: 2022-01-26
Payer: COMMERCIAL

## 2022-01-26 DIAGNOSIS — M25.562 ACUTE PAIN OF LEFT KNEE: ICD-10-CM

## 2022-01-26 DIAGNOSIS — M62.81 PROXIMAL MUSCLE WEAKNESS: ICD-10-CM

## 2022-01-26 PROCEDURE — 97110 THERAPEUTIC EXERCISES: CPT | Performed by: PHYSICAL THERAPIST

## 2022-01-26 NOTE — PROGRESS NOTES
"[facility]   Physical Therapy Treatment Note     Name: Tip Wiggins  Clinic Number: [id number]    Therapy Diagnosis:   Encounter Diagnoses   Name Primary?    Acute pain of left knee     Proximal muscle weakness      Physician: Parish Espitia MD    Visit Date: 1/26/2022    Physician Orders: PT Eval and Treat  Medical Diagnosis from Referral:   S83.512A (ICD-10-CM) - Rupture of anterior cruciate ligament of left knee, initial encounter   T14.8XXA (ICD-10-CM) - Contusion of bone      Evaluation Date: 1/13/2022  Authorization Period Expiration: 12/31/2022  Plan of Care Expiration: 02/24/2022  Progress Note Due: 02/12/2022  Visit # / Visits authorized: 2/ 20  FOTO: 1/ 3       PTA Visit #: 0/5     Time In: 12:45 pm  Time Out: 1:30 pm  Total Billable Time: 42 minutes    Precautions: Standard    SUBJECTIVE     Pt reports: no pain, just feelings of instability. Patient reports increased knee stiffness following long car ride over the weekend.   He was compliant with home exercise program.  Response to previous treatment: muscle fatigue and soreness   Functional change: none yet    Pain: 1/10  Location: left knee      OBJECTIVE     Objective Measures updated at progress report unless specified.     TREATMENT     Tip received the treatments listed below:      Tip received therapeutic exercises to develop strength for 42 minutes including:  Quad sets 5" hold, 25x  SLR 3x10  LAQ 3#, 30x  Standing Hamstring Curls, 2#, 3x10   Lateral squat walks 3 laps RTB  Monster walks 3 laps RTB  Shuttle leg press 8 bands 3x12 (DL)  Shuttle leg press 7 bands 25x (SL)  Step ups 8" box, 10#KB. 25x  Lateral Step downs 4" box, balance pole used. 4x6  Upright bike 6 minutes L3  Single Leg Stance - left, with ~20 degrees of knee flexion, 1x30" on turf, 2x30" on airex  Wall squats with ball - 3x8    Tip received the following manual therapy techniques: Myofacial release and Soft tissue Mobilization were applied " to the: left knee for 0 minutes, including:      Tip participated in neuromuscular re-education activities to improve: Balance, Kinesthetic, Sense and Proprioception for 0 minutes. The following activities were included:      Tip participated in dynamic functional therapeutic activities to improve functional performance for   minutes, including:        Patient Education and Home Exercises     Education provided:   Role of Physical Therapist  Physical Therapy Plan Of Care  home exercise program   [facility] Protocol  Importance of compliance with home exercise program   Compliance with attendance to therapy         Written Home Exercises Provided: yes.  Exercises were reviewed and Tip was able to demonstrate them prior to the end of the session.  Tip demonstrated good  understanding of the education provided.      See EMR under Patient Instructions for exercises provided 1/13/2022    ASSESSMENT   Patient tolerated treatment well today with no complaints of discomfort or increased pain. Patient requires cueing for proper quad activation during Therapeutic Exercise.     Tip Is progressing well towards his goals.   Pt prognosis is Excellent.     Pt will continue to benefit from skilled outpatient physical therapy to address the deficits listed in the problem list box on initial evaluation, provide pt/family education and to maximize pt's level of independence in the home and community environment.     Pt's spiritual, cultural and educational needs considered and pt agreeable to plan of care and goals.     Anticipated barriers to physical therapy: lifestyle    Goals:   SHORT TERM GOALS:  4 weeks 1/13/2022   1. Recent signs and systems trend is improving in order to progress towards LTG's.     2. Patient will be independent with HEP in order to further progress and return to maximal function.     3. Pain rating at Worst: 5/10 in order to progress towards increased independence with activity.     4. Patient  will be able to correct postural deviations in sitting and standing, to decrease pain and promote postural awareness for injury prevention.         LONG TERM GOALS: 6 weeks 1/13/2022   1. Patient will return to normal ADL, recreational, and work related activities with less pain and limitation.      2. Patient will improve AROM to stated goals in order to return to maximal functional potential.      3. Patient will improve Strength to stated goals of appropriate musculature in order to improve functional independence.      4. Pain Rating at Best: 1/10 to improve Quality of Life.      5. Patient will meet predicted functional outcome (FOTO) score: 77% to increase self-worth & perceived functional ability.     6. Patient will have met/partially met personal goal of: returning to work with minimal pain and/or limitations           PLAN     Continue with physical therapy as planned.     Plan of care Certification: 1/13/2022 to 02/24/2022.     Outpatient Physical Therapy 3 times weekly for 6 weeks to include the following interventions: Electrical Stimulation Kitt[place] and Symmetrical Biphasic, Gait Training, Manual Therapy, Moist Heat/ Ice, Neuromuscular Re-ed, Patient Education, Self Care, Therapeutic Activities and Therapeutic Exercise.     Kaleb Crawford, PT

## 2022-01-27 ENCOUNTER — CLINICAL SUPPORT (OUTPATIENT)
Dept: REHABILITATION | Facility: HOSPITAL | Age: 32
End: 2022-01-27
Payer: COMMERCIAL

## 2022-01-27 DIAGNOSIS — M25.562 ACUTE PAIN OF LEFT KNEE: ICD-10-CM

## 2022-01-27 DIAGNOSIS — M62.81 PROXIMAL MUSCLE WEAKNESS: ICD-10-CM

## 2022-01-27 PROCEDURE — 97110 THERAPEUTIC EXERCISES: CPT

## 2022-01-27 NOTE — PROGRESS NOTES
"[facility]   Physical Therapy Treatment Note     Name: Tip Wiggins  Clinic Number: [id number]    Therapy Diagnosis:   Encounter Diagnoses   Name Primary?    Acute pain of left knee     Proximal muscle weakness      Physician: Parish Espitia MD    Visit Date: 1/27/2022    Physician Orders: PT Eval and Treat  Medical Diagnosis from Referral:   S83.512A (ICD-10-CM) - Rupture of anterior cruciate ligament of left knee, initial encounter   T14.8XXA (ICD-10-CM) - Contusion of bone      Evaluation Date: 1/13/2022  Authorization Period Expiration: 12/31/2022  Plan of Care Expiration: 02/24/2022  Progress Note Due: 02/12/2022  Visit # / Visits authorized: 3/20  FOTO: 1/ 3       PTA Visit #: 0/5     Time In: 10:47 am  Time Out: 11:29 am  Total Billable Time: 38 minutes    Precautions: Standard    SUBJECTIVE     Pt reports: no significant change in status since his visit yesterday - muscle soreness.   He was compliant with home exercise program.  Response to previous treatment: muscle fatigue and soreness   Functional change: none yet    Pain: 1/10  Location: left knee      OBJECTIVE     Objective Measures updated at progress report unless specified.     TREATMENT     Tip received the treatments listed below:      Tip received therapeutic exercises to develop strength for 38 minutes including:    Upright bike 5 minutes L3  Quad sets 5" hold, 25x  SLR 3x10  LAQ 4#, 30x - Control Eccentric - 2 seconds  Prone Hamstring Curls, 4#, 3x8 - controlled eccentric  Lateral squat walks 3 laps Green Theraband   Monster walks 3 laps Green Theraband   Leg Press - Single Leg - 3x10 - 4 Plates   Single Leg Stance - left, with ~20 degrees of knee flexion, 1x30" on turf, 2x30" on MOBO  Single Leg Squats to Box - 2x6 - 24"    HELD:  Step ups 8" box, 10#KB. 25x  Lateral Step downs 4" box, balance pole used. 4x6    Tip received the following manual therapy techniques: Myofacial release and Soft tissue " Mobilization were applied to the: left knee for 0 minutes, including:      Tip participated in neuromuscular re-education activities to improve: Balance, Kinesthetic, Sense and Proprioception for 0 minutes. The following activities were included:      Tip participated in dynamic functional therapeutic activities to improve functional performance for   minutes, including:        Patient Education and Home Exercises     Education provided:   Role of Physical Therapist  Physical Therapy Plan Of Care  home exercise program   [facility] Protocol  Importance of compliance with home exercise program   Compliance with attendance to therapy         Written Home Exercises Provided: none.  Exercises were reviewed and Tip was able to demonstrate them prior to the end of the session.  Tip demonstrated good  understanding of the education provided.      See EMR under Patient Instructions for exercises provided 1/13/2022    ASSESSMENT   Patient tolerated treatment well today with no complaints of discomfort or increased pain. Patient requires cueing for proper quad activation during Therapeutic Exercise. Increased resistance used during band walks to improve lateral hip strength. Continue to progress plan of care as tolerated with an emphasis on improving lower extremity strength and range of motion.    Tip Is progressing well towards his goals.   Pt prognosis is Excellent.     Pt will continue to benefit from skilled outpatient physical therapy to address the deficits listed in the problem list box on initial evaluation, provide pt/family education and to maximize pt's level of independence in the home and community environment.     Pt's spiritual, cultural and educational needs considered and pt agreeable to plan of care and goals.     Anticipated barriers to physical therapy: lifestyle    Goals:   SHORT TERM GOALS:  4 weeks 1/13/2022   1. Recent signs and systems trend is improving in order to progress towards  LTG's. PM    2. Patient will be independent with HEP in order to further progress and return to maximal function.  PM   3. Pain rating at Worst: 5/10 in order to progress towards increased independence with activity.  PM   4. Patient will be able to correct postural deviations in sitting and standing, to decrease pain and promote postural awareness for injury prevention.   PM      LONG TERM GOALS: 6 weeks 1/13/2022   1. Patient will return to normal ADL, recreational, and work related activities with less pain and limitation.   PM   2. Patient will improve AROM to stated goals in order to return to maximal functional potential.   PM   3. Patient will improve Strength to stated goals of appropriate musculature in order to improve functional independence.   PM   4. Pain Rating at Best: 1/10 to improve Quality of Life.   PM   5. Patient will meet predicted functional outcome (FOTO) score: 77% to increase self-worth & perceived functional ability.  PM   6. Patient will have met/partially met personal goal of: returning to work with minimal pain and/or limitations  PM         PLAN     Continue with physical therapy as planned.     Plan of care Certification: 1/13/2022 to 02/24/2022.     Outpatient Physical Therapy 3 times weekly for 6 weeks to include the following interventions: Electrical Stimulation Burmese and Symmetrical Biphasic, Gait Training, Manual Therapy, Moist Heat/ Ice, Neuromuscular Re-ed, Patient Education, Self Care, Therapeutic Activities and Therapeutic Exercise.     Gerardo Montes, PT

## 2022-01-28 ENCOUNTER — CLINICAL SUPPORT (OUTPATIENT)
Dept: REHABILITATION | Facility: HOSPITAL | Age: 32
End: 2022-01-28
Payer: COMMERCIAL

## 2022-01-28 DIAGNOSIS — M25.562 ACUTE PAIN OF LEFT KNEE: ICD-10-CM

## 2022-01-28 DIAGNOSIS — M62.81 PROXIMAL MUSCLE WEAKNESS: ICD-10-CM

## 2022-01-28 PROCEDURE — 97140 MANUAL THERAPY 1/> REGIONS: CPT

## 2022-01-28 PROCEDURE — 97110 THERAPEUTIC EXERCISES: CPT

## 2022-01-28 NOTE — PROGRESS NOTES
"[facility]   Physical Therapy Treatment Note     Name: Tip Wiggins  Clinic Number: [id number]    Therapy Diagnosis:   Encounter Diagnoses   Name Primary?    Acute pain of left knee     Proximal muscle weakness      Physician: Parish Espitia MD    Visit Date: 1/28/2022    Physician Orders: PT Eval and Treat  Medical Diagnosis from Referral:   S83.512A (ICD-10-CM) - Rupture of anterior cruciate ligament of left knee, initial encounter   T14.8XXA (ICD-10-CM) - Contusion of bone      Evaluation Date: 1/13/2022  Authorization Period Expiration: 12/31/2022  Plan of Care Expiration: 02/24/2022  Progress Note Due: 02/12/2022  Visit # / Visits authorized: 4/20  FOTO: 1/ 3       PTA Visit #: 0/5     Time In: 9:38 am  Time Out: 10:14 am  Total Billable Time: 36 minutes    Precautions: Standard    SUBJECTIVE     Pt reports: no significant change in status since his visit yesterday - patient reports he went for 45 minutes on the elliptical this morning  He was compliant with home exercise program.  Response to previous treatment: muscle fatigue and soreness   Functional change: none yet    Pain: 0/10  Location: left knee      OBJECTIVE     Objective Measures updated at progress report unless specified.     TREATMENT     Tip received the treatments listed below:      Tip received therapeutic exercises to develop strength for 28 minutes including:    Upright bike 5 minutes L3  Quad sets 5" hold, 25x  SLR 3x10  LAQ 4#, 30x - Control Eccentric - 2 seconds  Prone Hamstring Curls, 4#, 3x8 - controlled eccentric  Lateral squat walks 3 laps Green Theraband   Monster walks 3 laps Green Theraband   Single Leg Stance - left, with ~20 degrees of knee flexion, 2x30" - MOBO - Kettlebell Pass - 15# - ODDS/EVENS  Single Leg Squats to Box - 3x6 - 22"  Step ups 12" box, 10#KB - right hand. 25x    HELD:  Lateral Step downs 4" box, balance pole used. 4x6  Leg Press - Single Leg - 3x10 - 4 Plates     Tip " received the following manual therapy techniques: Myofacial release and Soft tissue Mobilization were applied to the: left knee for 8 minutes, including:  Patellar Mobilizations  Tibiofemoral mobilization     Tip participated in neuromuscular re-education activities to improve: Balance, Kinesthetic, Sense and Proprioception for 0 minutes. The following activities were included:      Tip participated in dynamic functional therapeutic activities to improve functional performance for   minutes, including:        Patient Education and Home Exercises     Education provided:   Role of Physical Therapist  Physical Therapy Plan Of Care  home exercise program   [facility] Protocol  Importance of compliance with home exercise program   Compliance with attendance to therapy         Written Home Exercises Provided: continue prior home exercise program .  Exercises were reviewed and Tip was able to demonstrate them prior to the end of the session.  Tip demonstrated good  understanding of the education provided.      See EMR under Patient Instructions for exercises provided 1/13/2022    ASSESSMENT   Patient tolerated treatment well today with no complaints of discomfort or increased pain. Patient requires cueing for proper quad activation during single leg squats to box. Increased resistance used during step ups to improve lower extremity strength. Continue to progress plan of care as tolerated with an emphasis on improving lower extremity strength and range of motion.    Tip Is progressing well towards his goals.   Pt prognosis is Excellent.     Pt will continue to benefit from skilled outpatient physical therapy to address the deficits listed in the problem list box on initial evaluation, provide pt/family education and to maximize pt's level of independence in the home and community environment.     Pt's spiritual, cultural and educational needs considered and pt agreeable to plan of care and goals.      Anticipated barriers to physical therapy: lifestyle    Goals:   SHORT TERM GOALS:  4 weeks 1/13/2022   1. Recent signs and systems trend is improving in order to progress towards LTG's. PM    2. Patient will be independent with HEP in order to further progress and return to maximal function.  PM   3. Pain rating at Worst: 5/10 in order to progress towards increased independence with activity.  PM   4. Patient will be able to correct postural deviations in sitting and standing, to decrease pain and promote postural awareness for injury prevention.   PM      LONG TERM GOALS: 6 weeks 1/13/2022   1. Patient will return to normal ADL, recreational, and work related activities with less pain and limitation.   PM   2. Patient will improve AROM to stated goals in order to return to maximal functional potential.   PM   3. Patient will improve Strength to stated goals of appropriate musculature in order to improve functional independence.   PM   4. Pain Rating at Best: 1/10 to improve Quality of Life.   PM   5. Patient will meet predicted functional outcome (FOTO) score: 77% to increase self-worth & perceived functional ability.  PM   6. Patient will have met/partially met personal goal of: returning to work with minimal pain and/or limitations  PM         PLAN     Continue with physical therapy as planned.     Plan of care Certification: 1/13/2022 to 02/24/2022.     Outpatient Physical Therapy 3 times weekly for 6 weeks to include the following interventions: Electrical Stimulation Nauruan and Symmetrical Biphasic, Gait Training, Manual Therapy, Moist Heat/ Ice, Neuromuscular Re-ed, Patient Education, Self Care, Therapeutic Activities and Therapeutic Exercise.     Gerardo Montes, PT

## 2022-01-31 ENCOUNTER — CLINICAL SUPPORT (OUTPATIENT)
Dept: REHABILITATION | Facility: HOSPITAL | Age: 32
End: 2022-01-31
Payer: COMMERCIAL

## 2022-01-31 DIAGNOSIS — M62.81 PROXIMAL MUSCLE WEAKNESS: ICD-10-CM

## 2022-01-31 DIAGNOSIS — M25.562 ACUTE PAIN OF LEFT KNEE: ICD-10-CM

## 2022-01-31 PROCEDURE — 97110 THERAPEUTIC EXERCISES: CPT

## 2022-01-31 NOTE — PROGRESS NOTES
"[facility]   Physical Therapy Treatment Note     Name: Tip Wiggins  Clinic Number: [id number]    Therapy Diagnosis:   Encounter Diagnoses   Name Primary?    Acute pain of left knee     Proximal muscle weakness      Physician: Parish Espitia MD    Visit Date: 1/31/2022    Physician Orders: PT Eval and Treat  Medical Diagnosis from Referral:   S83.512A (ICD-10-CM) - Rupture of anterior cruciate ligament of left knee, initial encounter   T14.8XXA (ICD-10-CM) - Contusion of bone      Evaluation Date: 1/13/2022  Authorization Period Expiration: 12/31/2022  Plan of Care Expiration: 02/24/2022  Progress Note Due: 02/12/2022  Visit # / Visits authorized: 6/20  FOTO: 1/ 3       PTA Visit #: 0/5     Time In: 10:35 am  Time Out: 11:24 am  Total Billable Time: 51 minutes    Precautions: Standard    SUBJECTIVE     Pt reports: soreness in L anterior hip that he attributes to attending therapy three days in a row last week. Patient also reports decreased knee swelling and improved knee mobility during squatting. 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     He was compliant with home exercise program.  Response to previous treatment: muscle fatigue and soreness   Functional change: decreased swelling and improved knee mobility.    Pain: 0/10  Location: left knee      OBJECTIVE     Objective Measures updated at progress report unless specified.     TREATMENT     Tip received the treatments listed below:      Tip received therapeutic exercises to develop strength for 45 minutes including:    Upright bike 5 minutes L3  Quad sets 5" hold, 25x  SLR 3x10  Prone L hip flexor stretch with towel roll under knee - 3x30"  LAQ 5#, 30x - Control Eccentric - 2 seconds  Prone Hamstring Curls, 5#, 3x8 - controlled eccentric  Lateral squat walks 3 laps Green Theraband   Monster walks 3 " "ronna Green Theraband   Single Leg Stance - left, with ~20 degrees of knee flexion, 2x30" each - MOBO - Kettlebell Pass - 10# - ODDS/EVENS  Single Leg Squats to Box - 3x10 - 20"  Step ups 12" box, 10#KB - right hand. 25x    HELD:  Lateral Step downs 4" box, balance pole used. 4x6  Leg Press - Single Leg - 3x10 - 4 Plates     Tip received the following manual therapy techniques: Myofacial release and Soft tissue Mobilization were applied to the: left knee for 0 minutes, including:  Patellar Mobilizations  Tibiofemoral mobilization         Patient Education and Home Exercises     Education provided:   Role of Physical Therapist  Physical Therapy Plan Of Care  home exercise program   [facility] Protocol  Importance of compliance with home exercise program   Compliance with attendance to therapy         Written Home Exercises Provided: continue prior home exercise program .  Exercises were reviewed and Tip was able to demonstrate them prior to the end of the session.  Tip demonstrated good  understanding of the education provided.      See EMR under Patient Instructions for exercises provided 1/13/2022    ASSESSMENT   Patient tolerated treatment well today with no complaints of increased knee pain or discomfort. Progressed Therapeutic Exercise by adding Prone Hip Flexor Stretch to improve hip mobility and decrease soreness. Resistance increased for Long Arc Quad and Prone Hamstring Curl to improve left lower extremity strength.     Tip Is progressing well towards his goals.   Pt prognosis is Excellent.     Pt will continue to benefit from skilled outpatient physical therapy to address the deficits listed in the problem list box on initial evaluation, provide pt/family education and to maximize pt's level of independence in the home and community environment.     Pt's spiritual, cultural and educational needs considered and pt agreeable to plan of care and goals.     Anticipated barriers to physical therapy: " lifestyle    Goals:   SHORT TERM GOALS:  4 weeks 1/13/2022   1. Recent signs and systems trend is improving in order to progress towards LTG's. PM    2. Patient will be independent with HEP in order to further progress and return to maximal function.  PM   3. Pain rating at Worst: 5/10 in order to progress towards increased independence with activity.  PM   4. Patient will be able to correct postural deviations in sitting and standing, to decrease pain and promote postural awareness for injury prevention.   PM      LONG TERM GOALS: 6 weeks 1/13/2022   1. Patient will return to normal ADL, recreational, and work related activities with less pain and limitation.   PM   2. Patient will improve AROM to stated goals in order to return to maximal functional potential.   PM   3. Patient will improve Strength to stated goals of appropriate musculature in order to improve functional independence.   PM   4. Pain Rating at Best: 1/10 to improve Quality of Life.   PM   5. Patient will meet predicted functional outcome (FOTO) score: 77% to increase self-worth & perceived functional ability.  PM   6. Patient will have met/partially met personal goal of: returning to work with minimal pain and/or limitations  PM         PLAN     Continue with physical therapy as planned.     Plan of care Certification: 1/13/2022 to 02/24/2022.     Outpatient Physical Therapy 3 times weekly for 6 weeks to include the following interventions: Electrical Stimulation Ugandan and Symmetrical Biphasic, Gait Training, Manual Therapy, Moist Heat/ Ice, Neuromuscular Re-ed, Patient Education, Self Care, Therapeutic Activities and Therapeutic Exercise.       Maxine Cash, SPT  Aung Hirsch, PT

## 2022-02-02 ENCOUNTER — CLINICAL SUPPORT (OUTPATIENT)
Dept: REHABILITATION | Facility: HOSPITAL | Age: 32
End: 2022-02-02
Payer: COMMERCIAL

## 2022-02-02 ENCOUNTER — TELEPHONE (OUTPATIENT)
Dept: ORTHOPEDICS | Facility: CLINIC | Age: 32
End: 2022-02-02
Payer: COMMERCIAL

## 2022-02-02 DIAGNOSIS — M62.81 PROXIMAL MUSCLE WEAKNESS: ICD-10-CM

## 2022-02-02 DIAGNOSIS — M25.562 ACUTE PAIN OF LEFT KNEE: ICD-10-CM

## 2022-02-02 PROCEDURE — 97110 THERAPEUTIC EXERCISES: CPT

## 2022-02-02 NOTE — PROGRESS NOTES
[facility]   Physical Therapy Treatment Note     Name: Tip Wiggins  Clinic Number: [id number]    Therapy Diagnosis:   Encounter Diagnoses   Name Primary?    Acute pain of left knee     Proximal muscle weakness      Physician: Parish Espitia MD    Visit Date: 2/2/2022    Physician Orders: PT Eval and Treat  Medical Diagnosis from Referral:   S83.512A (ICD-10-CM) - Rupture of anterior cruciate ligament of left knee, initial encounter   T14.8XXA (ICD-10-CM) - Contusion of bone      Evaluation Date: 1/13/2022  Authorization Period Expiration: 12/31/2022  Plan of Care Expiration: 02/24/2022  Progress Note Due: 02/12/2022  Visit # / Visits authorized: 6/20   FOTO: 1/ 3       PTA Visit #: 0/5     Time In: 11:00 am  Time Out: 11:50 am  Total Billable Time: 45 minutes    Precautions: Standard    SUBJECTIVE     Pt reports: overall his knee if feeling good - he has been working out and staying compliant with home exercise program.    He was compliant with home exercise program.  Response to previous treatment: muscle fatigue and soreness   Functional change: decreased swelling and improved knee mobility.    Pain: 0/10  Location: left knee      OBJECTIVE     Objective Measures updated at progress report unless specified.     Range of Motion:      Knee Right active Right Passive Left Active Left passive Goal   Flexion 125 130 125 130 135 deg.   Extension 0 -2 hyper 0 -2 hyper 0 deg.         Lower Extremity Strength:     Right LE   Left LE   Goal   Knee extension: 5/5 Knee extension: 5/5 5/5   Knee flexion: 5/5 Knee flexion: 5/5 5/5   Hip flexion: 5/5 Hip flexion: 5/5 5/5   Hip extension:  4/5 Hip extension: 4/5 5/5   Hip abduction: 4/5 Hip abduction: 4/5 5/5          TREATMENT     Tip received the treatments listed below:      Tip received therapeutic exercises to develop strength for 45 minutes including patient education, assessment, and the following:    Elliptical - 2:30 forward - 2:30  "reverse (5 Total)  SLR 3x10  Prone L hip flexor stretch with towel roll under knee - 3x30"  Nautilus Knee Extensions - 3x8 + Single Leg Eccentrics - 2 Plates   Nautilus Leg Press - 4 Plates - 3x12  Trap Bar Deadlift 1x5 - 75#  Farmer's Carry + Varying steps and obstacles - 45# EACH hand - 3 Laps  Step Ups 12" Box - 3x10      HELD:    Prone Hamstring Curls, 5#, 3x8 - controlled eccentric  Lateral squat walks 3 laps Green Theraband   Monster walks 3 laps Green Theraband   Single Leg Stance - left, with ~20 degrees of knee flexion, 2x30" each - MOBO - Kettlebell Pass - 10# - ODDS/EVENS  Single Leg Squats to Box - 3x10 - 20"  Step ups 12" box, 10#KB - right hand. 25x  Lateral Step downs 4" box, balance pole used. 4x6  Leg Press - Single Leg - 3x10 - 4 Plates     Tip received the following manual therapy techniques: Myofacial release and Soft tissue Mobilization were applied to the: left knee for 0 minutes, including:  Patellar Mobilizations  Tibiofemoral mobilization         Patient Education and Home Exercises     Education provided:   Role of Physical Therapist  Physical Therapy Plan Of Care  home exercise program   [facility] Protocol  Importance of compliance with home exercise program   Compliance with attendance to therapy         Written Home Exercises Provided: continue prior home exercise program .  Exercises were reviewed and Tip was able to demonstrate them prior to the end of the session.  Tip demonstrated good  understanding of the education provided.      See EMR under Patient Instructions for exercises provided 1/13/2022    ASSESSMENT   Patient tolerated treatment well today with no complaints of increased knee pain or discomfort. 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 Tip has made great progress with physical therapy - will " continue to test his functional capacity over the next two visits before his follow up appointment with Dr. Espitia.  Tip Is progressing well towards his goals.   Pt prognosis is Excellent.     Pt will continue to benefit from skilled outpatient physical therapy to address the deficits listed in the problem list box on initial evaluation, provide pt/family education and to maximize pt's level of independence in the home and community environment.     Pt's spiritual, cultural and educational needs considered and pt agreeable to plan of care and goals.     Anticipated barriers to physical therapy: lifestyle    Goals:   SHORT TERM GOALS:  4 weeks 1/13/2022   1. Recent signs and systems trend is improving in order to progress towards LTG's. MET    2. Patient will be independent with HEP in order to further progress and return to maximal function.  MET   3. Pain rating at Worst: 5/10 in order to progress towards increased independence with activity.  MET   4. Patient will be able to correct postural deviations in sitting and standing, to decrease pain and promote postural awareness for injury prevention.   MET      LONG TERM GOALS: 6 weeks 1/13/2022   1. Patient will return to normal ADL, recreational, and work related activities with less pain and limitation.   MET   2. Patient will improve AROM to stated goals in order to return to maximal functional potential.   PM   3. Patient will improve Strength to stated goals of appropriate musculature in order to improve functional independence.   PM   4. Pain Rating at Best: 1/10 to improve Quality of Life.   MET   5. Patient will meet predicted functional outcome (FOTO) score: 77% to increase self-worth & perceived functional ability.  MET   6. Patient will have met/partially met personal goal of: returning to work with minimal pain and/or limitations  PM         PLAN     Continue with physical therapy as planned.     Plan of care Certification: 1/13/2022 to  02/24/2022.     Outpatient Physical Therapy 3 times weekly for 6 weeks to include the following interventions: Electrical Stimulation Chadian and Symmetrical Biphasic, Gait Training, Manual Therapy, Moist Heat/ Ice, Neuromuscular Re-ed, Patient Education, Self Care, Therapeutic Activities and Therapeutic Exercise.       Maxine Cash, SPT  Gerardo Montes, PT

## 2022-02-04 ENCOUNTER — PATIENT MESSAGE (OUTPATIENT)
Dept: ORTHOPEDICS | Facility: CLINIC | Age: 32
End: 2022-02-04
Payer: COMMERCIAL

## 2022-02-04 ENCOUNTER — CLINICAL SUPPORT (OUTPATIENT)
Dept: REHABILITATION | Facility: HOSPITAL | Age: 32
End: 2022-02-04
Payer: COMMERCIAL

## 2022-02-04 ENCOUNTER — TELEPHONE (OUTPATIENT)
Dept: ORTHOPEDICS | Facility: CLINIC | Age: 32
End: 2022-02-04
Payer: COMMERCIAL

## 2022-02-04 DIAGNOSIS — M25.562 ACUTE PAIN OF LEFT KNEE: ICD-10-CM

## 2022-02-04 DIAGNOSIS — M62.81 PROXIMAL MUSCLE WEAKNESS: ICD-10-CM

## 2022-02-04 PROCEDURE — 97110 THERAPEUTIC EXERCISES: CPT

## 2022-02-04 NOTE — TELEPHONE ENCOUNTER
LVM stating that I was attempting to reschedule 2/9 appt due to Dr. Espitia being out. Left call-back number and [employer] message sent

## 2022-02-04 NOTE — PROGRESS NOTES
"[facility]   Physical Therapy Treatment Note     Name: Tip Wiggins  Clinic Number: [id number]    Therapy Diagnosis:   Encounter Diagnoses   Name Primary?    Acute pain of left knee     Proximal muscle weakness      Physician: Parish Espitia MD    Visit Date: 2/4/2022    Physician Orders: PT Eval and Treat  Medical Diagnosis from Referral:   S83.512A (ICD-10-CM) - Rupture of anterior cruciate ligament of left knee, initial encounter   T14.8XXA (ICD-10-CM) - Contusion of bone      Evaluation Date: 1/13/2022  Authorization Period Expiration: 12/31/2022  Plan of Care Expiration: 02/24/2022  Progress Note Due: 03/08/2022  Visit # / Visits authorized: 7/20   FOTO: 2/ 3       PTA Visit #: 0/5     Time In: 11:05 am  Time Out: 11:45 am  Total Billable Time: 38 minutes    Precautions: Standard    SUBJECTIVE     Pt reports: no adverse reactions to last therapy session    He was compliant with home exercise program.  Response to previous treatment: muscle fatigue and soreness   Functional change: decreased swelling and improved knee mobility.    Pain: 0/10  Location: left knee      OBJECTIVE     Objective Measures updated at progress report unless specified.     TREATMENT     Tip received the treatments listed below:      Tip received therapeutic exercises to develop strength for 38 minutes including patient education, assessment, and the following:    Elliptical - 2:30 forward - 2:30 reverse (5 Total)  Single Leg Squat to Box + KB - 24" - 3x12 - 15#  SLR 3x10 - 3#   Nautilus Knee Extensions - 3x8 + Single Leg Eccentrics - 2 Plates   Nautilus Leg Press - 4 Plates - 3x12  Trap Bar Deadlift 3x5 - 75#  Farmer's Carry + Varying steps and obstacles and varying surfaces - 25# EACH hand - 7 Laps  Step Ups 12" Box - 3x10  Step Ups 18" Box - 1x5      HELD:    Prone Hamstring Curls, 5#, 3x8 - controlled eccentric  Lateral squat walks 3 laps Green Theraband   Monster walks 3 laps Green Theraband " "  Single Leg Stance - left, with ~20 degrees of knee flexion, 2x30" each - MOBO - Kettlebell Pass - 10# - ODDS/EVENS  Single Leg Squats to Box - 3x10 - 20"  Step ups 12" box, 10#KB - right hand. 25x  Lateral Step downs 4" box, balance pole used. 4x6  Leg Press - Single Leg - 3x10 - 4 Plates     Tip received the following manual therapy techniques: Myofacial release and Soft tissue Mobilization were applied to the: left knee for 0 minutes, including:  Patellar Mobilizations  Tibiofemoral mobilization         Patient Education and Home Exercises     Education provided:   Role of Physical Therapist  Physical Therapy Plan Of Care  home exercise program   [facility] Protocol  Importance of compliance with home exercise program   Compliance with attendance to therapy         Written Home Exercises Provided: continue prior home exercise program .  Exercises were reviewed and Tip was able to demonstrate them prior to the end of the session.  Tip demonstrated good  understanding of the education provided.      See EMR under Patient Instructions for exercises provided 1/13/2022    ASSESSMENT   Patient tolerated treatment well today with no complaints of increased knee pain or discomfort. 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 Tip has made great progress with physical therapy - will continue to test his functional capacity over the next visit.    Tip Is progressing well towards his goals.   Pt prognosis is Excellent.     Pt will continue to benefit from skilled outpatient physical therapy to address the deficits listed in the problem list box on initial evaluation, provide pt/family education and to maximize pt's level of independence in the home and community environment.     Pt's spiritual, cultural and educational needs considered and pt " agreeable to plan of care and goals.     Anticipated barriers to physical therapy: lifestyle    Goals:   SHORT TERM GOALS:  4 weeks 1/13/2022   1. Recent signs and systems trend is improving in order to progress towards LTG's. MET    2. Patient will be independent with HEP in order to further progress and return to maximal function.  MET   3. Pain rating at Worst: 5/10 in order to progress towards increased independence with activity.  MET   4. Patient will be able to correct postural deviations in sitting and standing, to decrease pain and promote postural awareness for injury prevention.   MET      LONG TERM GOALS: 6 weeks 1/13/2022   1. Patient will return to normal ADL, recreational, and work related activities with less pain and limitation.   MET   2. Patient will improve AROM to stated goals in order to return to maximal functional potential.   PM   3. Patient will improve Strength to stated goals of appropriate musculature in order to improve functional independence.   PM   4. Pain Rating at Best: 1/10 to improve Quality of Life.   MET   5. Patient will meet predicted functional outcome (FOTO) score: 77% to increase self-worth & perceived functional ability.  MET   6. Patient will have met/partially met personal goal of: returning to work with minimal pain and/or limitations  PM         PLAN     Continue with physical therapy as planned.     Plan of care Certification: 1/13/2022 to 02/24/2022.     Outpatient Physical Therapy 3 times weekly for 6 weeks to include the following interventions: Electrical Stimulation Nepalese and Symmetrical Biphasic, Gait Training, Manual Therapy, Moist Heat/ Ice, Neuromuscular Re-ed, Patient Education, Self Care, Therapeutic Activities and Therapeutic Exercise.     Gerardo Montes, PT

## 2022-02-07 ENCOUNTER — CLINICAL SUPPORT (OUTPATIENT)
Dept: REHABILITATION | Facility: HOSPITAL | Age: 32
End: 2022-02-07
Payer: COMMERCIAL

## 2022-02-07 DIAGNOSIS — M62.81 PROXIMAL MUSCLE WEAKNESS: ICD-10-CM

## 2022-02-07 DIAGNOSIS — M25.562 ACUTE PAIN OF LEFT KNEE: ICD-10-CM

## 2022-02-07 PROCEDURE — 97110 THERAPEUTIC EXERCISES: CPT

## 2022-02-07 NOTE — PROGRESS NOTES
"[facility]   Physical Therapy Treatment Note     Name: Tip Wiggins  Clinic Number: [id number]    Therapy Diagnosis:   Encounter Diagnoses   Name Primary?    Acute pain of left knee     Proximal muscle weakness      Physician: Parish Espitia MD    Visit Date: 2/7/2022    Physician Orders: PT Eval and Treat  Medical Diagnosis from Referral:   S83.512A (ICD-10-CM) - Rupture of anterior cruciate ligament of left knee, initial encounter   T14.8XXA (ICD-10-CM) - Contusion of bone      Evaluation Date: 1/13/2022  Authorization Period Expiration: 12/31/2022  Plan of Care Expiration: 02/24/2022  Progress Note Due: 03/08/2022  Visit # / Visits authorized: 9/20   FOTO: 2/ 3       PTA Visit #: 0/5     Time In: 11:25 am  Time Out: 12:08 pm  Total Billable Time: 43 minutes    Precautions: Standard    SUBJECTIVE     Pt reports: no complaints of knee pain.    He was compliant with home exercise program.  Response to previous treatment: mild muscle fatigue and soreness   Functional change: decreased swelling and improved knee mobility.    Pain: 0/10  Location: left knee      OBJECTIVE     Objective Measures updated at progress report unless specified.     TREATMENT     Tip received the treatments listed below:      Tip received therapeutic exercises to develop strength for 40 minutes including patient education, assessment, and the following:    Elliptical - 2:30 forward - 2:30 reverse (5 Total)  Step Ups 12" Box with 25# in each hand - 3x10  Lunges with slider - 2x10 each leg   Trap Bar Deadlift 3x5 - 95#  Single Leg Squat to Box + 15 #KB - 24" - 3x10  Farmer's Carry + Varying steps and obstacles and varying surfaces - 25# EACH hand - 8 Laps  Nautilus Knee Extensions - 3x12 + Single Leg Eccentrics - 2 Plates   Nautilus Leg Press - 5 Plates - 3x12  Single Leg Stance on BOSU - 3x30"    BOLD = new this visit      HELD:    SLR 3x10 - 3#   Prone Hamstring Curls, 5#, 3x8 - controlled " "eccentric  Lateral squat walks 3 laps Green Theraband   Monster walks 3 laps Green Theraband   Single Leg Stance - left, with ~20 degrees of knee flexion, 2x30" each - MOBO - Kettlebell Pass - 10# - ODDS/EVENS  Single Leg Squats to Box - 3x10 - 20"  Step ups 12" box, 10#KB - right hand. 25x  Lateral Step downs 4" box, balance pole used. 4x6  Leg Press - Single Leg - 3x10 - 4 Plates     Tip received the following manual therapy techniques: Myofacial release and Soft tissue Mobilization were applied to the: left knee for 0 minutes, including:  Patellar Mobilizations  Tibiofemoral mobilization         Patient Education and Home Exercises     Education provided:   Role of Physical Therapist  Physical Therapy Plan Of Care  home exercise program   [facility] Protocol  Importance of compliance with home exercise program   Compliance with attendance to therapy         Written Home Exercises Provided: continue prior home exercise program .  Exercises were reviewed and Tip was able to demonstrate them prior to the end of the session.  Tip demonstrated good  understanding of the education provided.      See EMR under Patient Instructions for exercises provided 1/13/2022    ASSESSMENT   Patient tolerated treatment well today with no complaints of increased pain. Progressed Therapeutic Exercise by adding Lunges and performing Step Ups with weights in bilateral upper extremities to simulate work duties and improve lower extremity strength.    Tip Is progressing well towards his goals.   Pt prognosis is Excellent.     Pt will continue to benefit from skilled outpatient physical therapy to address the deficits listed in the problem list box on initial evaluation, provide pt/family education and to maximize pt's level of independence in the home and community environment.     Pt's spiritual, cultural and educational needs considered and pt agreeable to plan of care and goals.     Anticipated barriers to physical therapy: " lifestyle    Goals:   SHORT TERM GOALS:  4 weeks 1/13/2022   1. Recent signs and systems trend is improving in order to progress towards LTG's. MET    2. Patient will be independent with HEP in order to further progress and return to maximal function.  MET   3. Pain rating at Worst: 5/10 in order to progress towards increased independence with activity.  MET   4. Patient will be able to correct postural deviations in sitting and standing, to decrease pain and promote postural awareness for injury prevention.   MET      LONG TERM GOALS: 6 weeks 1/13/2022   1. Patient will return to normal ADL, recreational, and work related activities with less pain and limitation.   MET   2. Patient will improve AROM to stated goals in order to return to maximal functional potential.   PM   3. Patient will improve Strength to stated goals of appropriate musculature in order to improve functional independence.   PM   4. Pain Rating at Best: 1/10 to improve Quality of Life.   MET   5. Patient will meet predicted functional outcome (FOTO) score: 77% to increase self-worth & perceived functional ability.  MET   6. Patient will have met/partially met personal goal of: returning to work with minimal pain and/or limitations  PM         PLAN     Continue with physical therapy as planned.     Plan of care Certification: 1/13/2022 to 02/24/2022.     Outpatient Physical Therapy 3 times weekly for 6 weeks to include the following interventions: Electrical Stimulation Norwegian and Symmetrical Biphasic, Gait Training, Manual Therapy, Moist Heat/ Ice, Neuromuscular Re-ed, Patient Education, Self Care, Therapeutic Activities and Therapeutic Exercise.     Maxine Cash, SPT  Aung Hirsch, PT

## 2022-02-09 ENCOUNTER — OFFICE VISIT (OUTPATIENT)
Dept: ORTHOPEDICS | Facility: CLINIC | Age: 32
End: 2022-02-09
Payer: COMMERCIAL

## 2022-02-09 VITALS — BODY MASS INDEX: 32.2 KG/M2 | HEIGHT: 71 IN | WEIGHT: 230 LBS

## 2022-02-09 DIAGNOSIS — M25.562 LEFT KNEE PAIN, UNSPECIFIED CHRONICITY: ICD-10-CM

## 2022-02-09 DIAGNOSIS — S89.92XD INJURY OF LEFT KNEE, SUBSEQUENT ENCOUNTER: ICD-10-CM

## 2022-02-09 DIAGNOSIS — S83.512D RUPTURE OF ANTERIOR CRUCIATE LIGAMENT OF LEFT KNEE, SUBSEQUENT ENCOUNTER: Primary | ICD-10-CM

## 2022-02-09 PROCEDURE — 1160F RVW MEDS BY RX/DR IN RCRD: CPT | Mod: CPTII,S$GLB,, | Performed by: PHYSICIAN ASSISTANT

## 2022-02-09 PROCEDURE — 99999 PR PBB SHADOW E&M-EST. PATIENT-LVL III: CPT | Mod: PBBFAC,,, | Performed by: PHYSICIAN ASSISTANT

## 2022-02-09 PROCEDURE — 3008F PR BODY MASS INDEX (BMI) DOCUMENTED: ICD-10-PCS | Mod: CPTII,S$GLB,, | Performed by: PHYSICIAN ASSISTANT

## 2022-02-09 PROCEDURE — 3008F BODY MASS INDEX DOCD: CPT | Mod: CPTII,S$GLB,, | Performed by: PHYSICIAN ASSISTANT

## 2022-02-09 PROCEDURE — 1159F PR MEDICATION LIST DOCUMENTED IN MEDICAL RECORD: ICD-10-PCS | Mod: CPTII,S$GLB,, | Performed by: PHYSICIAN ASSISTANT

## 2022-02-09 PROCEDURE — 99213 OFFICE O/P EST LOW 20 MIN: CPT | Mod: S$GLB,,, | Performed by: PHYSICIAN ASSISTANT

## 2022-02-09 PROCEDURE — 99999 PR PBB SHADOW E&M-EST. PATIENT-LVL III: ICD-10-PCS | Mod: PBBFAC,,, | Performed by: PHYSICIAN ASSISTANT

## 2022-02-09 PROCEDURE — 1159F MED LIST DOCD IN RCRD: CPT | Mod: CPTII,S$GLB,, | Performed by: PHYSICIAN ASSISTANT

## 2022-02-09 PROCEDURE — 99213 PR OFFICE/OUTPT VISIT, EST, LEVL III, 20-29 MIN: ICD-10-PCS | Mod: S$GLB,,, | Performed by: PHYSICIAN ASSISTANT

## 2022-02-09 PROCEDURE — 1160F PR REVIEW ALL MEDS BY PRESCRIBER/CLIN PHARMACIST DOCUMENTED: ICD-10-PCS | Mod: CPTII,S$GLB,, | Performed by: PHYSICIAN ASSISTANT

## 2022-02-09 NOTE — PATIENT INSTRUCTIONS
Assessment:  Tip Wiggins is a  31 y.o. 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 with a chief complaint of Pain of the Left Knee  Presents today for a recheck on left knee ACL tear occurring on 12/21/21, has been continuing to do non-operative treatment with physical therapy at [facility].   · Left knee ACL tear- nonoperative treatment plan     Encounter Diagnoses   Name Primary?    Rupture of anterior cruciate ligament of left knee, subsequent encounter Yes    Injury of left knee, subsequent encounter     Left knee pain, unspecified chronicity         Plan:   Fax over job requirements for return to work activities.    Recheck after completing physical therapy.     Follow-up: Recheck after completing physical therapy or sooner if there are any problems between now and then.    Thank you for choosing [facility] and Dr. Parish Espitia for your orthopedic & sports medicine care. It is our goal to provide you with exceptional care that will help keep you healthy, active, and get you back in the game.    If you felt that you received exemplary care today, please consider leaving us feedback on [employer] at [email or web address].     Please do not hesitate to reach out to us via email, phone, or MyChart with any questions, concerns, or feedback.    If you are experiencing pain/discomfort ,or have questions after 5pm and would like to be connected to the [facility] on-call team, please call this number and specify which Sports Medicine provider is treating you: [phone]

## 2022-02-09 NOTE — PROGRESS NOTES
Patient ID: Tip Wiggins  YOB: 1990  MRN: [id number]    Chief Complaint: Pain of the Left Knee    Referred By: Self referred    History of Present Illness: Tip Wiggins is a 31 y.o. 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 with a chief complaint of Pain of the Left Knee  Patient presents to the clinic today for a follow-up on his Left Knee 6 weeks s/p injury. He rates his average pain as 0/10, but notes some soreness today after doing the stair master for 20 minutes after 30 minutes of being on the elliptical yesterday. He goes to PT 3x per week at [facility]. He states that he started exercising (stairmaster, elliptical, stretching) about two weeks ago. He is hoping to be cleared to return to work today.    HPI    Previous History of Present Illness 1/12/2022:   Patient presents to the clinic today for a Left Knee MRI Review.  He injured his knee on 12/31/2021 when his nephew jumped on his back while pivoting and he felt a pop in his knee and fell down.  He rates his pain as 4/10. He notes that yesterday his knee gave out from under him when he was stepping down from the tailgate of his truck. He felt like his knee was getting better until yesterday. Prior to the re-injury, he was able to squat slowly, kneeling was not bothering his knee, and pivoting would only sometimes aggravate his knee. Today is supposed to be his first day of PT, but he is considering postponing it due to the re-injury.    Previous Plan:  · Patient with femoral sided ACL tear.  He may meet criteria for ACL repair with BEAR implant.  We have discussed the procedure and outcomes from academic research studies that show this is an option.  · After long discussion, the patient would like to trial non-operative treatment. We have explained that if he continues to have instability within the D-O protocol he may need surgery in the future.    · We are checking with our representative about ACL repair to see what  the time constraints are for repair.  · Start physical therapy at [facility] with sports therapists with Kay protocol  · Voltaren gel & Ibuprofen OTC as needed      Past Medical History:   History reviewed. No pertinent past medical history.  Past Surgical History:   Procedure Laterality Date    ADENOIDECTOMY       History reviewed. No pertinent family history.  Social History     Socioeconomic History    Marital status: Unknown   Tobacco Use    Smoking status: Former Smoker    Smokeless tobacco: Current User     Medication List with Changes/Refills   Current Medications    DEXTROAMPHETAMINE-AMPHETAMINE (ADDERALL) 20 MG TABLET    Take 1 tablet by mouth 2 (two) times daily.    DICLOFENAC SODIUM (VOLTAREN) 1 % GEL    Apply 2 g topically 3 (three) times daily.    NAPROXEN SODIUM 220 MG CAP    Take 220 mg by mouth every other day.   Review of patient's allergies indicates:  No Known Allergies    Physical Exam:   Body mass index is 32.08 kg/m².  GENERAL: Well appearing, appropriate for stated age, no acute distress.  CARDIOVASCULAR: Pulses regular by peripheral palpation.  PULMONARY: Respirations are even and non-labored.  NEURO: Awake, alert, and oriented x 3.  PSYCH: Mood & affect are appropriate.  HEENT: Head is normocephalic and atraumatic.    General    Nursing note and vitals reviewed.          Right Knee Exam   Right knee exam is normal.    Inspection   Effusion: absent    Tenderness   The patient is experiencing no tenderness.     Range of Motion   Extension: 0   Flexion: 120     Tests   Ligament Examination Lachman: normal (-1 to 2mm) PCL-Posterior Drawer: normal (0 to 2mm)     MCL - Valgus: normal (0 to 2mm)  LCL - Varus: normal    Other   Sensation: normal    Left Knee Exam     Inspection   Effusion: absent    Tenderness   The patient is experiencing no tenderness.     Range of Motion   Extension: 0   Flexion: 120     Tests   Stability Lachman: abnormal PCL-Posterior Drawer: normal (0 to  2mm)  MCL - Valgus: normal (0 to 2mm)  LCL - Varus: normal (0 to 2mm)    Other   Sensation: normal    Muscle Strength   Right Lower Extremity   Hip Abduction: 5/5   Quadriceps:  5/5   Hamstrin/5   Left Lower Extremity   Hip Abduction: 5/5   Quadriceps:  5/5   Hamstrin/5     Vascular Exam     Right Pulses  Dorsalis Pedis:      2+  Posterior Tibial:      2+        Left Pulses  Dorsalis Pedis:      2+  Posterior Tibial:      2+          Neurovascular: Intact EHL, FHL, gastrocsoleus, and tibialis anterior. Sensation intact to light touch in superficial peroneal, deep peroneal, tibial, sural, and saphenous nerve distributions. Foot warm and well perfused with capillary refill of less than 2 seconds and palpable pedal pulses.     Imaging:   XR Results:  Results for orders placed during the hospital encounter of 22    X-ray Knee Ortho Left with Flexion    Narrative  EXAMINATION:  XR KNEE ORTHO LEFT WITH FLEXION    CLINICAL HISTORY:  . Pain in left knee    TECHNIQUE:  AP standing view of both knees, PA flexion standing views of both knees, and Merchant views of both knees were performed. A lateral view of the left knee was also performed.    COMPARISON:  None    FINDINGS:  No acute fracture or dislocation.  Equivocal minimal medial compartment narrowing on either side.  Soft tissues are normal.    Impression  As above      Electronically signed by: Haja Tsang MD  Date:    2022  Time:    11:11    MRI Results:  Results for orders placed during the hospital encounter of 22    MRI Knee Without Contrast Left    Narrative  EXAMINATION:  MRI KNEE WITHOUT CONTRAST LEFT    CLINICAL HISTORY:  Knee trauma, no prior imaging (Age >= 5y);left knee injury;Unspecified injury of left lower leg, initial encounter    TECHNIQUE:  Multiplanar, multisequence images were preformed of the left knee.    COMPARISON:  None.    FINDINGS:  Menisci:  There is no discrete tear of the medial or lateral  meniscus.    Ligaments:  ACL is disrupted at its femoral attachment.  PCL, MCL, and LCL complex are intact.    Tendons:  Extensor mechanism is maintained.    Cartilage:    Patellofemoral: Articular cartilage is maintained.    Medial tibiofemoral: Articular cartilage is maintained.    Lateral tibiofemoral: Articular cartilage is maintained.    Bone: Multifocal areas of marrow edema/contusion beneath the posterior aspect of the medial and lateral tibial plateaus and in the lateral femoral condyle.    Miscellaneous: Moderate size knee joint effusion.  Baker's cyst.    Impression  1. ACL disruption.  2. Multifocal bony contusions.  3. Moderate knee joint effusion.      Electronically signed by: ALLISON Bianchi MD  Date:    01/07/2022  Time:    12:24    CT Results:  No results found for this or any previous visit.    Relevant imaging results reviewed and interpreted by me, discussed with the patient and / or family today.     Other Tests:   No other tests performed today.    Patient Instructions     Assessment:  Tip Wiggins is a  31 y.o. 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 with a chief complaint of Pain of the Left Knee  Presents today for a recheck on left knee ACL tear occurring on 12/21/21, has been continuing to do non-operative treatment with physical therapy at [facility].   · Left knee ACL tear- nonoperative treatment plan     Encounter Diagnoses   Name Primary?    Rupture of anterior cruciate ligament of left knee, subsequent encounter Yes    Injury of left knee, subsequent encounter     Left knee pain, unspecified chronicity         Plan:   Fax over job requirements for return to work activities.    Recheck after completing physical therapy.     Follow-up: Recheck after completing physical therapy or sooner if there are any problems between now and then.    Thank you for choosing [facility] and Dr. Parish Espitia for your orthopedic & sports medicine care. It is our  goal to provide you with exceptional care that will help keep you healthy, active, and get you back in the game.    If you felt that you received exemplary care today, please consider leaving us feedback on [employer] at [email or web address].     Please do not hesitate to reach out to us via email, phone, or MyChart with any questions, concerns, or feedback.    If you are experiencing pain/discomfort ,or have questions after 5pm and would like to be connected to the [facility] on-call team, please call this number and specify which Sports Medicine provider is treating you: [phone]         Provider Note/Medical Decision Making:   Patient is continuing to do physical therapy at [facility] with improvement have discussed possible return to work with physical therapy work assessment.    I discussed worrisome and red flag signs and symptoms with the patient. The patient expressed understanding and agreed to alert me immediately or to go to the emergency room if they experience any of these.    Treatment plan was developed with input from the patient/family, and they expressed understanding and agreement with the plan. All questions were answered today.    Disclaimer: This note was prepared using a voice recognition system and is likely to have sound alike errors within the text.

## 2022-02-10 ENCOUNTER — CLINICAL SUPPORT (OUTPATIENT)
Dept: REHABILITATION | Facility: HOSPITAL | Age: 32
End: 2022-02-10
Payer: COMMERCIAL

## 2022-02-10 DIAGNOSIS — M62.81 PROXIMAL MUSCLE WEAKNESS: ICD-10-CM

## 2022-02-10 DIAGNOSIS — M25.562 ACUTE PAIN OF LEFT KNEE: ICD-10-CM

## 2022-02-10 PROCEDURE — 97110 THERAPEUTIC EXERCISES: CPT

## 2022-02-10 NOTE — PROGRESS NOTES
"[facility]   Physical Therapy Treatment Note     Name: Tip Wiggins  Clinic Number: [id number]    Therapy Diagnosis:   Encounter Diagnoses   Name Primary?    Acute pain of left knee     Proximal muscle weakness      Physician: Parish Espitia MD    Visit Date: 2/10/2022    Physician Orders: PT Eval and Treat  Medical Diagnosis from Referral:   S83.512A (ICD-10-CM) - Rupture of anterior cruciate ligament of left knee, initial encounter   T14.8XXA (ICD-10-CM) - Contusion of bone      Evaluation Date: 1/13/2022  Authorization Period Expiration: 12/31/2022  Plan of Care Expiration: 02/24/2022  Progress Note Due: 03/08/2022  Visit # / Visits authorized: 9/20 (+eval)  FOTO: 2/ 3       PTA Visit #: 0/5     Time In: 11:40 am  Time Out: 12:15 pm  Total Billable Time: 30 minutes    Precautions: Standard    SUBJECTIVE     Pt reports: no complaints of knee pain. Performed stair master and elliptical the other day and reports muscle soreness - denies sharp pain    He was compliant with home exercise program.  Response to previous treatment: mild muscle fatigue and soreness   Functional change: decreased swelling and improved knee mobility.    Pain: 0/10  Location: left knee      OBJECTIVE     Objective Measures updated at progress report unless specified.     TREATMENT     Tip received the treatments listed below:      Tip received therapeutic exercises to develop strength for 30 minutes including patient education, assessment, and the following:    Elliptical - 2 forward - 2 reverse (4 Total)  Step Ups 12" Box with 25# in each hand - 3x8  Lunges with slider - 2x10 - bilateral   Rack Pulls - 95#  Single Leg Squat to Box + 15 #KB - 22" - 3x10  Nautilus Leg Press - 6 Plates - 3x12  Single Leg Stance on BOSU - 3x30"  Nautilus Knee Extensions - 3x12 + Single Leg- 2 Plates     BOLD = new this visit      HELD:  Farmer's Carry + Varying steps and obstacles and varying surfaces - 25# EACH hand - 8 " "Laps    SLR 3x10 - 3#   Prone Hamstring Curls, 5#, 3x8 - controlled eccentric  Lateral squat walks 3 laps Green Theraband   Monster walks 3 laps Green Theraband   Single Leg Stance - left, with ~20 degrees of knee flexion, 2x30" each - MOBO - Kettlebell Pass - 10# - ODDS/EVENS  Single Leg Squats to Box - 3x10 - 20"  Step ups 12" box, 10#KB - right hand. 25x  Lateral Step downs 4" box, balance pole used. 4x6  Leg Press - Single Leg - 3x10 - 4 Plates     Tip received the following manual therapy techniques: Myofacial release and Soft tissue Mobilization were applied to the: left knee for 0 minutes, including:  Patellar Mobilizations  Tibiofemoral mobilization         Patient Education and Home Exercises     Education provided:   Role of Physical Therapist  Physical Therapy Plan Of Care  home exercise program   [facility] Protocol  Importance of compliance with home exercise program   Compliance with attendance to therapy         Written Home Exercises Provided: continue prior home exercise program .  Exercises were reviewed and Tip was able to demonstrate them prior to the end of the session.  Tip demonstrated good  understanding of the education provided.      See EMR under Patient Instructions for exercises provided 1/13/2022    ASSESSMENT   Patient tolerated treatment well today with no complaints of increased pain. Increased resistance on leg press in order to improve lower extremity strength. Will begin progressing patient to return to work. Continue to progress plan of care as tolerated.    Tip Is progressing well towards his goals.   Pt prognosis is Excellent.     Pt will continue to benefit from skilled outpatient physical therapy to address the deficits listed in the problem list box on initial evaluation, provide pt/family education and to maximize pt's level of independence in the home and community environment.     Pt's spiritual, cultural and educational needs considered and pt agreeable to " plan of care and goals.     Anticipated barriers to physical therapy: lifestyle    Goals:   SHORT TERM GOALS:  4 weeks 1/13/2022   1. Recent signs and systems trend is improving in order to progress towards LTG's. MET    2. Patient will be independent with HEP in order to further progress and return to maximal function.  MET   3. Pain rating at Worst: 5/10 in order to progress towards increased independence with activity.  MET   4. Patient will be able to correct postural deviations in sitting and standing, to decrease pain and promote postural awareness for injury prevention.   MET      LONG TERM GOALS: 6 weeks 1/13/2022   1. Patient will return to normal ADL, recreational, and work related activities with less pain and limitation.   MET   2. Patient will improve AROM to stated goals in order to return to maximal functional potential.   PM   3. Patient will improve Strength to stated goals of appropriate musculature in order to improve functional independence.   PM   4. Pain Rating at Best: 1/10 to improve Quality of Life.   MET   5. Patient will meet predicted functional outcome (FOTO) score: 77% to increase self-worth & perceived functional ability.  MET   6. Patient will have met/partially met personal goal of: returning to work with minimal pain and/or limitations  PM         PLAN     Continue with physical therapy as planned.     Plan of care Certification: 1/13/2022 to 02/24/2022.     Outpatient Physical Therapy 3 times weekly for 6 weeks to include the following interventions: Electrical Stimulation Tristanian and Symmetrical Biphasic, Gait Training, Manual Therapy, Moist Heat/ Ice, Neuromuscular Re-ed, Patient Education, Self Care, Therapeutic Activities and Therapeutic Exercise.     Gerardo Montes, PT

## 2022-02-11 ENCOUNTER — CLINICAL SUPPORT (OUTPATIENT)
Dept: REHABILITATION | Facility: HOSPITAL | Age: 32
End: 2022-02-11
Payer: COMMERCIAL

## 2022-02-11 DIAGNOSIS — M25.562 ACUTE PAIN OF LEFT KNEE: ICD-10-CM

## 2022-02-11 DIAGNOSIS — M62.81 PROXIMAL MUSCLE WEAKNESS: ICD-10-CM

## 2022-02-11 PROCEDURE — 97110 THERAPEUTIC EXERCISES: CPT

## 2022-02-11 NOTE — PROGRESS NOTES
"[facility]   Physical Therapy Treatment Note     Name: Tip Wiggins  Clinic Number: [id number]    Therapy Diagnosis:   Encounter Diagnoses   Name Primary?    Acute pain of left knee     Proximal muscle weakness      Physician: Parish Espitia MD    Visit Date: 2/11/2022    Physician Orders: PT Eval and Treat  Medical Diagnosis from Referral:   S83.512A (ICD-10-CM) - Rupture of anterior cruciate ligament of left knee, initial encounter   T14.8XXA (ICD-10-CM) - Contusion of bone      Evaluation Date: 1/13/2022  Authorization Period Expiration: 12/31/2022  Plan of Care Expiration: 02/24/2022  Progress Note Due: 03/08/2022  Visit # / Visits authorized: 10/20 (+eval)  FOTO: 3/3       PTA Visit #: 0/5     Time In: 11:10 am  Time Out: 11:40 am  Total Billable Time: 28 minutes    Precautions: Standard    SUBJECTIVE     Pt reports: no complaints of knee pain. No adverse reactions to last therapy session.    He was compliant with home exercise program.  Response to previous treatment: mild muscle fatigue and soreness   Functional change: decreased swelling and improved knee mobility.    Pain: 0/10  Location: left knee      OBJECTIVE     Objective Measures updated at progress report unless specified.     TREATMENT     Tip received the treatments listed below:      Tip received therapeutic exercises to develop strength for 28 minutes including patient education, assessment, and the following:    Performed Work Requirements of:  -Lifting 50# from floor to waist  -Pushing and Pulling of 50# horizontally  -Carrying 20# while walking  -Climbing Stairs    Elliptical - 2:30 forward - 2:30 reverse (5 Total)  Lunges with slider - 2x10 - bilateral  Nautilus Leg Press - 6 Plates - 3x12  SLR 3x10 - 4#  Lateral squat walks 3 laps Black Theraband   Monster walks 3 laps Black Theraband       BOLD = new this visit      HELD:  Single Leg Squat to Box + 15 #KB - 22" - 3x10  Farmer's Carry + Varying steps and " "obstacles and varying surfaces - 25# EACH hand - 8 Laps  Step Ups 12" Box with 25# in each hand - 3x8  Nautilus Knee Extensions - 3x12 + Single Leg- 2 Plates   Prone Hamstring Curls, 5#, 3x8 - controlled eccentric    Single Leg Stance - left, with ~20 degrees of knee flexion, 2x30" each - MOBO - Kettlebell Pass - 10# - ODDS/EVENS  Single Leg Squats to Box - 3x10 - 20"  Step ups 12" box, 10#KB - right hand. 25x  Lateral Step downs 4" box, balance pole used. 4x6  Leg Press - Single Leg - 3x10 - 4 Plates     Tip received the following manual therapy techniques: Myofacial release and Soft tissue Mobilization were applied to the: left knee for 0 minutes, including:  Patellar Mobilizations  Tibiofemoral mobilization         Patient Education and Home Exercises     Education provided:   Role of Physical Therapist  Physical Therapy Plan Of Care  home exercise program   [facility] Protocol  Importance of compliance with home exercise program   Compliance with attendance to therapy         Written Home Exercises Provided: continue prior home exercise program .  Exercises were reviewed and Tip was able to demonstrate them prior to the end of the session.  Tip demonstrated good  understanding of the education provided.      See EMR under Patient Instructions for exercises provided 1/13/2022    ASSESSMENT   Patient tolerated treatment well today with no complaints of increased pain. Patient was put through activities that were directly related to his work requirements and he performed each activity with 100% proficiency and no reports of discomfort. Will send results of today's visit over to Dr. Espitia for further evaluation. Continue to progress plan of care as tolerated.    Tip Is progressing well towards his goals.   Pt prognosis is Excellent.     Pt will continue to benefit from skilled outpatient physical therapy to address the deficits listed in the problem list box on initial evaluation, provide pt/family " education and to maximize pt's level of independence in the home and community environment.     Pt's spiritual, cultural and educational needs considered and pt agreeable to plan of care and goals.     Anticipated barriers to physical therapy: lifestyle    Goals:   SHORT TERM GOALS:  4 weeks 1/13/2022   1. Recent signs and systems trend is improving in order to progress towards LTG's. MET    2. Patient will be independent with HEP in order to further progress and return to maximal function.  MET   3. Pain rating at Worst: 5/10 in order to progress towards increased independence with activity.  MET   4. Patient will be able to correct postural deviations in sitting and standing, to decrease pain and promote postural awareness for injury prevention.   MET      LONG TERM GOALS: 6 weeks 1/13/2022   1. Patient will return to normal ADL, recreational, and work related activities with less pain and limitation.   MET   2. Patient will improve AROM to stated goals in order to return to maximal functional potential.   PM   3. Patient will improve Strength to stated goals of appropriate musculature in order to improve functional independence.   PM   4. Pain Rating at Best: 1/10 to improve Quality of Life.   MET   5. Patient will meet predicted functional outcome (FOTO) score: 77% to increase self-worth & perceived functional ability.  MET   6. Patient will have met/partially met personal goal of: returning to work with minimal pain and/or limitations  PM         PLAN     Continue with physical therapy as planned.     Plan of care Certification: 1/13/2022 to 02/24/2022.     Outpatient Physical Therapy 3 times weekly for 6 weeks to include the following interventions: Electrical Stimulation Iraqi and Symmetrical Biphasic, Gait Training, Manual Therapy, Moist Heat/ Ice, Neuromuscular Re-ed, Patient Education, Self Care, Therapeutic Activities and Therapeutic Exercise.     Gerardo Montes, PT

## 2022-02-14 ENCOUNTER — TELEPHONE (OUTPATIENT)
Dept: ORTHOPEDICS | Facility: CLINIC | Age: 32
End: 2022-02-14
Payer: COMMERCIAL

## 2022-02-14 ENCOUNTER — CLINICAL SUPPORT (OUTPATIENT)
Dept: REHABILITATION | Facility: HOSPITAL | Age: 32
End: 2022-02-14
Payer: COMMERCIAL

## 2022-02-14 DIAGNOSIS — M62.81 PROXIMAL MUSCLE WEAKNESS: ICD-10-CM

## 2022-02-14 DIAGNOSIS — M25.562 ACUTE PAIN OF LEFT KNEE: ICD-10-CM

## 2022-02-14 PROCEDURE — 97110 THERAPEUTIC EXERCISES: CPT

## 2022-02-14 NOTE — TELEPHONE ENCOUNTER
LVM stating that I was returning pt call. 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

## 2022-02-14 NOTE — PROGRESS NOTES
"[facility]   Physical Therapy Treatment Note     Name: Tip Wiggins  Clinic Number: [id number]    Therapy Diagnosis:   Encounter Diagnoses   Name Primary?    Acute pain of left knee     Proximal muscle weakness      Physician: Parish Espitia MD    Visit Date: 2/14/2022    Physician Orders: PT Eval and Treat  Medical Diagnosis from Referral:   S83.512A (ICD-10-CM) - Rupture of anterior cruciate ligament of left knee, initial encounter   T14.8XXA (ICD-10-CM) - Contusion of bone      Evaluation Date: 1/13/2022  Authorization Period Expiration: 12/31/2022  Plan of Care Expiration: 02/24/2022  Progress Note Due: 03/08/2022  Visit # / Visits authorized: 11/20 (+1 for eval)  FOTO: 3/3       PTA Visit #: 0/5     Time In: 11:30 am  Time Out: 12:10 pm  Total Billable Time: 40 minutes    Precautions: Standard    SUBJECTIVE     Pt reports: mild soreness. Patient reports that his work has not received the return to work paperwork from MD office.      He was compliant with home exercise program.  Response to previous treatment: mild muscle fatigue and soreness   Functional change: decreased swelling and improved knee mobility.    Pain: 0/10  Location: left knee      OBJECTIVE     Objective Measures updated at progress report unless specified.     TREATMENT     Tip received the treatments listed below:      Tip received therapeutic exercises to develop strength for 38 minutes including patient education, assessment, and the following:    Elliptical - 2:30 forward - 2:30 reverse (5 Total)  Lunges with slider - 2x10 - bilateral  Nautilus Leg Press (DL) - 6 Plates - 3x12  Nautilus Leg Press (SL) - 6 plates - 3x10  Nautilus Knee Extensions (SL) - 3 plates - 3x10  Nautilus Hamstring Curls (DL) - 3 plates - 3x10  Lateral Step downs 6" box - 3x10  Lateral squat walks 2 laps Black Theraband   Monster walks 2 laps Black Theraband   Single Leg Squats to Box - 3x8 to 24" box without kickstand   Step Ups " "12" Box with 25# in each hand - 3x8  Single Leg Stance - left, with ~20 degrees of knee flexion, 2x30" each - MOBO - Kettlebell Pass - 10# - ODDS/EVENS      BOLD = new this visit      HELD:  Single Leg Squat to Box + 15 #KB - 22" - 3x10  Farmer's Carry + Varying steps and obstacles and varying surfaces - 25# EACH hand - 8 Laps  Prone Hamstring Curls, 5#, 3x8 - controlled eccentric  Step ups 12" box, 10#KB - right hand. 25x  Leg Press - Single Leg - 3x10 - 4 Plates   SLR 3x10 - 4#    Performed Work Requirements of:  -Lifting 50# from floor to waist  -Pushing and Pulling of 50# horizontally  -Carrying 20# while walking  -Climbing Stairs    Tip received the following manual therapy techniques: Myofacial release and Soft tissue Mobilization were applied to the: left knee for 0 minutes, including:  Patellar Mobilizations  Tibiofemoral mobilization         Patient Education and Home Exercises     Education provided:   Role of Physical Therapist  Physical Therapy Plan Of Care  home exercise program   [facility] Protocol  Importance of compliance with home exercise program   Compliance with attendance to therapy         Written Home Exercises Provided: continue prior home exercise program .  Exercises were reviewed and Tip was able to demonstrate them prior to the end of the session.  Tip demonstrated good  understanding of the education provided.      See EMR under Patient Instructions for exercises provided 1/13/2022    ASSESSMENT   Patient tolerated treatment well today with no complaints of pain. Progressed Therapeutic Exercise by adding Nautilus Hamstring Curls to improve hamstring strength and increasing step height for Lateral Step Downs to improve knee and lateral hip strength.     Tip Is progressing well towards his goals.   Pt prognosis is Excellent.     Pt will continue to benefit from skilled outpatient physical therapy to address the deficits listed in the problem list box on initial evaluation, " provide pt/family education and to maximize pt's level of independence in the home and community environment.     Pt's spiritual, cultural and educational needs considered and pt agreeable to plan of care and goals.     Anticipated barriers to physical therapy: lifestyle    Goals:   SHORT TERM GOALS:  4 weeks 1/13/2022   1. Recent signs and systems trend is improving in order to progress towards LTG's. MET    2. Patient will be independent with HEP in order to further progress and return to maximal function.  MET   3. Pain rating at Worst: 5/10 in order to progress towards increased independence with activity.  MET   4. Patient will be able to correct postural deviations in sitting and standing, to decrease pain and promote postural awareness for injury prevention.   MET      LONG TERM GOALS: 6 weeks 1/13/2022   1. Patient will return to normal ADL, recreational, and work related activities with less pain and limitation.   MET   2. Patient will improve AROM to stated goals in order to return to maximal functional potential.   PM   3. Patient will improve Strength to stated goals of appropriate musculature in order to improve functional independence.   PM   4. Pain Rating at Best: 1/10 to improve Quality of Life.   MET   5. Patient will meet predicted functional outcome (FOTO) score: 77% to increase self-worth & perceived functional ability.  MET   6. Patient will have met/partially met personal goal of: returning to work with minimal pain and/or limitations  PM         PLAN     Continue with physical therapy as planned.     Plan of care Certification: 1/13/2022 to 02/24/2022.     Outpatient Physical Therapy 3 times weekly for 6 weeks to include the following interventions: Electrical Stimulation [place] and Symmetrical Biphasic, Gait Training, Manual Therapy, Moist Heat/ Ice, Neuromuscular Re-ed, Patient Education, Self Care, Therapeutic Activities and Therapeutic Exercise.     Aung Hirsch, PT   Maxine Cash,  SPT

## 2022-02-15 ENCOUNTER — PATIENT MESSAGE (OUTPATIENT)
Dept: ORTHOPEDICS | Facility: CLINIC | Age: 32
End: 2022-02-15
Payer: COMMERCIAL

## 2022-11-28 ENCOUNTER — TELEPHONE (OUTPATIENT)
Dept: ORTHOPEDICS | Facility: CLINIC | Age: 32
End: 2022-11-28
Payer: COMMERCIAL

## 2022-11-28 NOTE — TELEPHONE ENCOUNTER
LVM stating that I was reaching out in regards to a L Knee injury referral [facility]. Left call back number.